# Patient Record
Sex: MALE | Race: OTHER | HISPANIC OR LATINO
[De-identification: names, ages, dates, MRNs, and addresses within clinical notes are randomized per-mention and may not be internally consistent; named-entity substitution may affect disease eponyms.]

---

## 2019-02-21 PROBLEM — Z00.00 ENCOUNTER FOR PREVENTIVE HEALTH EXAMINATION: Status: ACTIVE | Noted: 2019-02-21

## 2019-02-28 ENCOUNTER — APPOINTMENT (OUTPATIENT)
Dept: NEUROLOGY | Facility: CLINIC | Age: 64
End: 2019-02-28
Payer: MEDICARE

## 2019-02-28 VITALS
DIASTOLIC BLOOD PRESSURE: 83 MMHG | WEIGHT: 159 LBS | TEMPERATURE: 98.7 F | OXYGEN SATURATION: 96 % | SYSTOLIC BLOOD PRESSURE: 157 MMHG | HEIGHT: 65 IN | HEART RATE: 74 BPM | BODY MASS INDEX: 26.49 KG/M2

## 2019-02-28 VITALS — SYSTOLIC BLOOD PRESSURE: 176 MMHG | DIASTOLIC BLOOD PRESSURE: 96 MMHG

## 2019-02-28 DIAGNOSIS — Z82.49 FAMILY HISTORY OF ISCHEMIC HEART DISEASE AND OTHER DISEASES OF THE CIRCULATORY SYSTEM: ICD-10-CM

## 2019-02-28 DIAGNOSIS — Z83.3 FAMILY HISTORY OF DIABETES MELLITUS: ICD-10-CM

## 2019-02-28 DIAGNOSIS — Z86.69 PERSONAL HISTORY OF OTHER DISEASES OF THE NERVOUS SYSTEM AND SENSE ORGANS: ICD-10-CM

## 2019-02-28 PROCEDURE — 99213 OFFICE O/P EST LOW 20 MIN: CPT

## 2019-02-28 PROCEDURE — 95984 ALYS BRN NPGT PRGRMG ADDL 15: CPT

## 2019-02-28 PROCEDURE — 95983 ALYS BRN NPGT PRGRMG 15 MIN: CPT

## 2019-02-28 NOTE — DISCUSSION/SUMMARY
[FreeTextEntry1] : ET s/p L czi DBS with residual right hand action tremor that interferes with some ADLs. \par \par Recommendations:\par DBS programmed today with noted tremor reduction:\par     -DBS programming 20 minutes.\par \par F/U with Dr. Koeppel for seizure management\par \par F/U 3 months\par

## 2019-02-28 NOTE — PHYSICAL EXAM
[General Appearance - Alert] : alert [General Appearance - In No Acute Distress] : in no acute distress [Cranial Nerves Oculomotor (III)] : extraocular motion intact [Cranial Nerves Facial (VII)] : face symmetrical [FreeTextEntry1] : There is a mild chin tremor. There is a mild vocal tremor on phonation. Rest tremor is absent. There is a moderate distal tremor in his left hand with winged tremor. His right hand postural tremor is minimal, but breakthrough as he he hold his hand close to his mouth. His action tremor is 2+ R, 3+ L. There is no bradykinesia. There is mild overshoot on finger chasing b/l. Walks with narrow base. Unsteady tandem walking [FreeTextEntry8] : refer to movement exam

## 2019-02-28 NOTE — HISTORY OF PRESENT ILLNESS
[FreeTextEntry1] : Patient with PMH of seizure d/o who is here for  followup for management of his ET. He has a left cZI DBS implanted in 2016. Over the past several months, he reports that his right hand tremor has a larger amplitude  that seem to be generated proximally. He is not able to shave on his own. He holds his right hand in order to eat, and dresses independently. His gait is stable and has not fallen. He struggles to write as well. His seizure meds were changed by Dr. Koeppel 5- 6months ago due to patient having breakthrough seizure.

## 2019-05-23 ENCOUNTER — APPOINTMENT (OUTPATIENT)
Dept: NEUROLOGY | Facility: CLINIC | Age: 64
End: 2019-05-23
Payer: MEDICARE

## 2019-05-23 VITALS
BODY MASS INDEX: 26.66 KG/M2 | HEIGHT: 65 IN | HEART RATE: 70 BPM | WEIGHT: 160 LBS | SYSTOLIC BLOOD PRESSURE: 146 MMHG | DIASTOLIC BLOOD PRESSURE: 85 MMHG | OXYGEN SATURATION: 95 %

## 2019-05-23 PROCEDURE — 95983 ALYS BRN NPGT PRGRMG 15 MIN: CPT

## 2019-05-23 PROCEDURE — 99214 OFFICE O/P EST MOD 30 MIN: CPT

## 2019-05-23 NOTE — HISTORY OF PRESENT ILLNESS
[FreeTextEntry1] : He states that his tremor has increased in amplitude. This occurs in episodes and not consistent. He can He can eat with his right hand using a spoon. He sometimes spills his food and cannot shave. He handwriting is not legible and has his wife sign for him. His GBP was reduced to 400mg TID by Dr. Koeppel for balance issues. He has not had any falls and dose reduction has not been beneficial.

## 2019-05-23 NOTE — PHYSICAL EXAM
[FreeTextEntry1] : There are no tremors at rest., There is no postural tremor on the right and 3+ on the left. There is 2+ action tremor on finger to nose with the ritght hand and 3+ with left hand. Tremor amplitude increases as it approaches target. Walks with narrow base. Tandem gait unsteady. Handwriting is illegible due to prominent writing tremor and sprials with right-hand have large amplitude waveforems.

## 2019-05-23 NOTE — DISCUSSION/SUMMARY
[FreeTextEntry1] : Patient with ET s/p Left VIM/czi DBS with residual writing and action tremor\par \par Plan\par DBS adjusted today\par consider trial of perampanel. Will d/w Dr. Koeppel\par Cont GBP 400mg TID\par \par f/u 4months

## 2019-09-26 ENCOUNTER — APPOINTMENT (OUTPATIENT)
Dept: NEUROLOGY | Facility: CLINIC | Age: 64
End: 2019-09-26
Payer: MEDICARE

## 2019-09-26 VITALS
DIASTOLIC BLOOD PRESSURE: 76 MMHG | WEIGHT: 315 LBS | HEART RATE: 80 BPM | TEMPERATURE: 98.7 F | OXYGEN SATURATION: 95 % | SYSTOLIC BLOOD PRESSURE: 136 MMHG | BODY MASS INDEX: 218.88 KG/M2

## 2019-09-26 PROCEDURE — 95970 ALYS NPGT W/O PRGRMG: CPT

## 2019-09-26 PROCEDURE — 99214 OFFICE O/P EST MOD 30 MIN: CPT

## 2019-09-26 RX ORDER — LACOSAMIDE 150 MG/1
150 TABLET, FILM COATED ORAL
Refills: 0 | Status: DISCONTINUED | COMMUNITY
Start: 2019-02-28 | End: 2019-09-26

## 2019-09-26 NOTE — DISCUSSION/SUMMARY
[FreeTextEntry1] : Improved rught hand tremor with residual postural tremor when his right hand close to his face. Eating has improved. DBS was unchanged today. Will d/w Dr. Koeppel about starting patient on perampanel to see if further tremor response achieved. RTO 2-3 months

## 2019-09-26 NOTE — PHYSICAL EXAM
[FreeTextEntry1] : There are no tremors at rest., There is no postural tremor on the right and 2+ on the left. There is mild action tremor on finger to nose with the right hand and 3+ with left hand. Trace winged tremor on the right. When wrist is extend with flexed elbow, action tremor is more prominent.  Tremor amplitude increases as it approaches target. Walks with narrow base. . Handwriting is illegible but less tremulous. Spirals have less waveforms when drawn with right hand

## 2019-09-26 NOTE — PROCEDURE
[FreeTextEntry1] : Deep Brain Stimulation Programming: Refer to scanned form(s)\par 100%\par \par settings: unchanged\par 0-1+: 5.8v/150/140\par

## 2019-12-12 ENCOUNTER — OTHER (OUTPATIENT)
Age: 64
End: 2019-12-12

## 2020-01-09 ENCOUNTER — APPOINTMENT (OUTPATIENT)
Dept: NEUROLOGY | Facility: CLINIC | Age: 65
End: 2020-01-09
Payer: MEDICARE

## 2020-01-09 VITALS
BODY MASS INDEX: 22.16 KG/M2 | SYSTOLIC BLOOD PRESSURE: 136 MMHG | DIASTOLIC BLOOD PRESSURE: 88 MMHG | HEART RATE: 66 BPM | TEMPERATURE: 99 F | WEIGHT: 133 LBS | HEIGHT: 65 IN | OXYGEN SATURATION: 98 %

## 2020-01-09 PROCEDURE — 99214 OFFICE O/P EST MOD 30 MIN: CPT

## 2020-01-09 PROCEDURE — 95983 ALYS BRN NPGT PRGRMG 15 MIN: CPT

## 2020-01-09 RX ORDER — LEVETIRACETAM 750 MG/1
750 TABLET, FILM COATED ORAL TWICE DAILY
Refills: 0 | Status: DISCONTINUED | COMMUNITY
Start: 2019-02-28 | End: 2020-01-09

## 2020-01-10 NOTE — PROCEDURE
[FreeTextEntry1] : Deep Brain Stimulation Programming:\par \par Left %\par 0-1+:5.8V/150/140\par \par Impedances: Contact 0-858; 1-2900;2-776,3-743\par TI: 2777/2.131mA\par \par switched to CC\par 0-1+: 5.4mA/140/140 - reduced action tremor. Able to drink from cup with right hand only\par \par DBS programming 7mins\par \par

## 2020-01-10 NOTE — DISCUSSION/SUMMARY
[FreeTextEntry1] : Improved hand tremor with notable functional gain since starting brivact. DBS readjusted today due to impedances changes at contact 1 with further improvement in drinking. He will call my office to inform us of the dose of perampanel he is on and I will see him back in 4months.

## 2020-01-10 NOTE — PHYSICAL EXAM
[FreeTextEntry1] : There are no tremors at rest., There is no postural tremor on the right and 3+ on the left. There is 1+ action tremor on finger to nose with the right hand and 3+ with left hand. Postural tremor is less when approaching his face. Able to hold a cup with right hand and hand shakes less when drinking and pouring. Handwriting is larger and less tremulous.  Walks with narrow base.

## 2020-01-10 NOTE — HISTORY OF PRESENT ILLNESS
[FreeTextEntry1] : Last month patient started Brivact 100mg BID by Dr. Day and has noticed reduction in his action tremor. He is able to shave. Has not noticed any changes in drinking or using utensils.  Balance is stable with no falls. He has not had any AEs. He has no issues charging RC, which he dose qod. He did not start propranolol and is off keppra\par \par \par Meds\par GBP:400mg TID\par Brivact 100mg BID

## 2020-07-16 ENCOUNTER — APPOINTMENT (OUTPATIENT)
Dept: NEUROLOGY | Facility: CLINIC | Age: 65
End: 2020-07-16

## 2021-10-28 ENCOUNTER — APPOINTMENT (OUTPATIENT)
Dept: NEUROLOGY | Facility: CLINIC | Age: 66
End: 2021-10-28
Payer: MEDICARE

## 2021-10-28 VITALS
BODY MASS INDEX: 21.16 KG/M2 | TEMPERATURE: 98.7 F | HEART RATE: 73 BPM | DIASTOLIC BLOOD PRESSURE: 95 MMHG | SYSTOLIC BLOOD PRESSURE: 160 MMHG | OXYGEN SATURATION: 98 % | WEIGHT: 127 LBS | HEIGHT: 65 IN

## 2021-10-28 PROCEDURE — 99214 OFFICE O/P EST MOD 30 MIN: CPT | Mod: 25

## 2021-10-28 PROCEDURE — 95970 ALYS NPGT W/O PRGRMG: CPT

## 2021-10-28 NOTE — DISCUSSION/SUMMARY
[FreeTextEntry1] : ET with L Czi DBS who is doing well overall. Right hand action tremor is mild, but he is able to do his ADLs at this time. \par \par Patient was counseled on the following recommendations:\par \par will leave DBS and meds unchanged\par will contact [employer] to replace patient's DBS  with updated version\par \par f/u 4months

## 2021-10-28 NOTE — HISTORY OF PRESENT ILLNESS
[FreeTextEntry1] : Patient has been stable over the last year\par Able to feed himself with his right hand. \par Left hand tremor remains bothersome and cannot do any ADLs with it\par walking is slightly unsteady w/o falls\par He charges his DBS every 3 days. Takes 1-2 hrs. \par Last seizure was 2 months ago and remains on brivact\par \par Meds\par GBP:400mg TID\par Brivact 125mg BID

## 2021-10-28 NOTE — END OF VISIT
PHYSICIAN NEXT STEPS:  Review Only    CHIEF COMPLAINT:  Chief Complaint/Protocol Used: Chest Pain  Onset: 2-3 days ago       ASSESSMENT:  ? Onset: 2-3 days ago   ? Location: L chest   ? Onset: 2-3 days ago (started again this am)   ? Severity: 6  ? Recurrent Symptom: Cold associated  ? Cause: Associated with cold symptoms   ? Cough: Yes, over a week   ? Work Or Exercise: No   ? Child's Appearance: A little less active, laying down now   -------------------------------------------------------    DISPOSITION:  Disposition Recommendation: See Physician within 24 Hours  Questions that led to disposition:  ? [1] MODERATE chest pain (interferes with normal activities) AND [2] unexplained (Exception: transient pain, brief pains, heartburn, pain due to coughing or sore muscles)  Patient Directed To: Unspecified  Patient Intended Action: Unspecified    CALL NOTES:  03/09/2020 at 7:36 AM by Gibson Campbell  ? Going to [facility] [place] [facility] [place]     DISPOSITION OVERRIDE/PROVIDER CONSULT:  Disposition Override: N/A  Override Source: Unspecified  Consulted with PCP: No  Consulted with On-Call Physician: No    CALLER CONTACT INFO:  Name: Fiorella Lomax (Mother)  Phone 1: [phone] (Work Phone)  Phone 2: [phone] (Mobile) - Preferred  Phone 3: [phone] (Home Phone)      ENCOUNTER STARTED:  03/09/20 07:28:54 AM  ENCOUNTER ASSIGNED TO/CLOSED BY:  Gibson Campbell @ 03/09/20 07:36:26 AM      -------------------------------------------------------    CARE ADVICE given per Chest Pain guideline.  SEE PHYSICIAN WITHIN 24 HOURS:  * IF OFFICE WILL BE OPEN: Your child needs to be examined within the next 24 hours. Call your child's doctor when the office opens, and make an appointment.  * IF OFFICE WILL BE CLOSED AND NO PCP TRIAGE: Your child needs to be examined within the next 24 hours. An Urgent Care Center is often a good source of care if your doctor's office is closed. Go to _________ .  * IF OFFICE WILL BE CLOSED AND PCP  TRIAGE REQUIRED: Your child may need to be seen within the next 24 hours. Your doctor will want to talk with you to decide what's best. I'll page him now. (Exception: from 10 pm to 7 am. Since this isn't serious, we'll   hold the page until morning.)  * IF PATIENT HAS NO PCP: Refer patient to an Urgent Care Center or Retail clinic.  Also try to help caller find a PCP (medical home) for their child.; PAIN MEDICINE:   * For pain relief, give acetaminophen every 4 hours OR ibuprofen every 6 hours as needed. (See Dosage table.); CALL BACK IF:   * Pain becomes SEVERE  * Pain becomes frequent  * Difficulty breathing occurs  * Your child becomes worse      UNDERSTANDS CARE ADVICE: Yes    AGREES WITH CARE ADVICE: Yes    WILL FOLLOW CARE ADVICE: Yes    -------------------------------------------------------   [Time Spent: ___ minutes] : I have spent [unfilled] minutes of time on the encounter.

## 2021-10-28 NOTE — PROCEDURE
[FreeTextEntry1] : Deep Brain Stimulation Programming:\par \par Left %\par 0-1+:5.4ma/140/140\par \par Impedances: Contact 0-839; 1-061;2-728, ,3-728\par Battery: 100%\par \par Final settings: unchanged\par \par DBS programming 7mins\par \par

## 2021-10-28 NOTE — PHYSICAL EXAM
[FreeTextEntry1] : There are no tremors at rest., There is no postural tremor on the right and 3+ on the left. There is 1-2+ action tremor on finger to nose with the right hand and 3+ with left hand. Postural tremor is less when approaching his face. Walks with narrow base. Handwriting: print is legible, less writing tremor

## 2022-02-03 ENCOUNTER — APPOINTMENT (OUTPATIENT)
Dept: NEUROLOGY | Facility: CLINIC | Age: 67
End: 2022-02-03
Payer: MEDICARE

## 2022-02-03 VITALS
DIASTOLIC BLOOD PRESSURE: 80 MMHG | HEIGHT: 65 IN | BODY MASS INDEX: 21.66 KG/M2 | WEIGHT: 130 LBS | TEMPERATURE: 98.6 F | SYSTOLIC BLOOD PRESSURE: 187 MMHG | OXYGEN SATURATION: 98 % | HEART RATE: 78 BPM

## 2022-02-03 PROCEDURE — 99214 OFFICE O/P EST MOD 30 MIN: CPT | Mod: 25

## 2022-02-03 PROCEDURE — 95970 ALYS NPGT W/O PRGRMG: CPT

## 2022-02-03 NOTE — PROCEDURE
[FreeTextEntry1] : Deep Brain Stimulation Programming:\par \par Left %\par 0-1+:5.4ma/140/140\par \par Impedances:ok\par Battery: 100%\par \par Final settings: unchanged\par \par DBS programming 7mins\par \par

## 2022-02-03 NOTE — DISCUSSION/SUMMARY
[FreeTextEntry1] : ET with L Czi DBS with mild increase in tremors L>R following covid infxn.\par Transient vertigo appears related to BPPV based on transient nature and postural triggers. There were no new cerebellar signs on exam\par \par Patient was counseled on the following recommendations:\par leave DBS unchanged\par refer for home vestibular therapy\par will est care with epilepsy team at . His sz meds were renewed until he sees epileptologist \par \par f/u 4months. \par

## 2022-02-03 NOTE — HISTORY OF PRESENT ILLNESS
[FreeTextEntry1] : Had covid at end of December - symptoms included vomiting.\par \par Since then he has been more unsteady when walking. Denies any falls\par Left hand tremor has slightly increased\par DBS charging going ok. \par Gets episodic vertigo that is transient. Triggered when he lays down. This has been improving over the past several weeks. \par \par \par Meds\par GBP:400mg TID\par Brivact 100mg BID

## 2022-02-03 NOTE — PHYSICAL EXAM
[FreeTextEntry1] : EOMI. No nystagmus. There are no tremors at rest., There is no postural tremor on the right and 3+ on the left. There is 2+ action tremor on finger to nose with the right hand and 3+ with left hand. Postural tremor is less when approaching his face.  Walks with narrow base. No overshooting. Turns to right on fukuda
n/a

## 2022-05-05 ENCOUNTER — APPOINTMENT (OUTPATIENT)
Dept: NEUROLOGY | Facility: CLINIC | Age: 67
End: 2022-05-05
Payer: MEDICARE

## 2022-05-05 VITALS
HEART RATE: 71 BPM | SYSTOLIC BLOOD PRESSURE: 159 MMHG | WEIGHT: 129 LBS | OXYGEN SATURATION: 98 % | BODY MASS INDEX: 21.49 KG/M2 | TEMPERATURE: 98.3 F | HEIGHT: 65 IN | DIASTOLIC BLOOD PRESSURE: 89 MMHG

## 2022-05-05 DIAGNOSIS — H81.10 BENIGN PAROXYSMAL VERTIGO, UNSPECIFIED EAR: ICD-10-CM

## 2022-05-05 PROCEDURE — 95983 ALYS BRN NPGT PRGRMG 15 MIN: CPT

## 2022-05-05 PROCEDURE — 99214 OFFICE O/P EST MOD 30 MIN: CPT | Mod: 25

## 2022-06-08 ENCOUNTER — RX RENEWAL (OUTPATIENT)
Age: 67
End: 2022-06-08

## 2022-07-13 ENCOUNTER — RX RENEWAL (OUTPATIENT)
Age: 67
End: 2022-07-13

## 2022-07-13 ENCOUNTER — APPOINTMENT (OUTPATIENT)
Dept: NEUROLOGY | Facility: CLINIC | Age: 67
End: 2022-07-13

## 2022-07-13 VITALS
HEIGHT: 65 IN | HEART RATE: 68 BPM | OXYGEN SATURATION: 98 % | TEMPERATURE: 98.9 F | DIASTOLIC BLOOD PRESSURE: 87 MMHG | BODY MASS INDEX: 20.16 KG/M2 | SYSTOLIC BLOOD PRESSURE: 175 MMHG | WEIGHT: 121 LBS

## 2022-07-13 PROCEDURE — 99215 OFFICE O/P EST HI 40 MIN: CPT

## 2022-07-14 NOTE — PROCEDURE
[FreeTextEntry1] : Deep Brain Stimulation Programming:\par \par Left %\par 0-1+:5.4ma/140/140\par \par Impedances:ok\par Battery: 100%\par \par Notes: F 150 - reduced winged tremor slightly \par  \par Final settings: 5.4/140/150\par \par DBS programming 7mins\par \par

## 2022-07-14 NOTE — PHYSICAL EXAM
[FreeTextEntry1] : + VT. There are no tremors at rest., There is no postural tremor on the right and 3+ on the left. 2+ winged tremor of low amplitude on R, moderate on L. There is 1+ action tremor on finger to nose with the right hand and 3+ with left hand. Postural tremor is less when approaching his face. Prominent writing tremors. Spirals have large amplitude waveforms. Gait is narrow base with slight reduction in SL. Postural reflexes intact

## 2022-07-14 NOTE — ASSESSMENT
[FreeTextEntry1] : \par \par \par \par Plan:\par Increase frequency to 150 Hz\par Referral to vestibular rehabilitation

## 2022-07-14 NOTE — DISCUSSION/SUMMARY
[FreeTextEntry1] : ET with L Czi DBS with mild increase in tremors\par Transient vertigo appears related to BPPV based on transient nature and postural triggers. There were no new cerebellar signs on exam\par \par Patient was counseled on the following recommendations:\par DBS adjusted \par refer for vestibular therapy\par will est care with epilepsy team at .\par f/u 4months. \par

## 2022-07-14 NOTE — HISTORY OF PRESENT ILLNESS
[FreeTextEntry1] : Patient states that his tremors and balanced worsened since having covid in January. The tremor does not interfere with his feeding himself, but he has to use a straw to drink, and can not drink from a cup. \par Experiences occasional vertigo when standing from supine position. It also happens when he is walking but has not fallen. \par \par He is taking his AEDs but has not established care with an epileptologist since last visit\par Last seizure was about two months ago as GTCs\par \par Meds\par GBP:400mg TID\par Brivact 100mg BID\par \par \par

## 2022-08-17 ENCOUNTER — INPATIENT (INPATIENT)
Facility: HOSPITAL | Age: 67
LOS: 1 days | Discharge: HOME | DRG: 100 | End: 2022-08-19
Attending: PSYCHIATRY & NEUROLOGY | Admitting: PSYCHIATRY & NEUROLOGY
Payer: MEDICARE

## 2022-08-17 VITALS
DIASTOLIC BLOOD PRESSURE: 85 MMHG | HEIGHT: 66 IN | WEIGHT: 117.73 LBS | TEMPERATURE: 98 F | OXYGEN SATURATION: 98 % | RESPIRATION RATE: 18 BRPM | SYSTOLIC BLOOD PRESSURE: 173 MMHG | HEART RATE: 59 BPM

## 2022-08-17 LAB
ALBUMIN SERPL ELPH-MCNC: 4.6 G/DL — SIGNIFICANT CHANGE UP (ref 3.3–5)
ALP SERPL-CCNC: 77 U/L — SIGNIFICANT CHANGE UP (ref 40–120)
ALT FLD-CCNC: 13 U/L — SIGNIFICANT CHANGE UP (ref 10–45)
ANION GAP SERPL CALC-SCNC: 9 MMOL/L — SIGNIFICANT CHANGE UP (ref 5–17)
AST SERPL-CCNC: 29 U/L — SIGNIFICANT CHANGE UP (ref 10–40)
BASOPHILS # BLD AUTO: 0.02 K/UL — SIGNIFICANT CHANGE UP (ref 0–0.2)
BASOPHILS NFR BLD AUTO: 0.4 % — SIGNIFICANT CHANGE UP (ref 0–2)
BILIRUB SERPL-MCNC: 0.7 MG/DL — SIGNIFICANT CHANGE UP (ref 0.2–1.2)
BUN SERPL-MCNC: 15 MG/DL — SIGNIFICANT CHANGE UP (ref 7–23)
CALCIUM SERPL-MCNC: 9.3 MG/DL — SIGNIFICANT CHANGE UP (ref 8.4–10.5)
CHLORIDE SERPL-SCNC: 100 MMOL/L — SIGNIFICANT CHANGE UP (ref 96–108)
CO2 SERPL-SCNC: 29 MMOL/L — SIGNIFICANT CHANGE UP (ref 22–31)
CREAT SERPL-MCNC: 0.78 MG/DL — SIGNIFICANT CHANGE UP (ref 0.5–1.3)
EGFR: 97 ML/MIN/1.73M2 — SIGNIFICANT CHANGE UP
EOSINOPHIL # BLD AUTO: 0.05 K/UL — SIGNIFICANT CHANGE UP (ref 0–0.5)
EOSINOPHIL NFR BLD AUTO: 1 % — SIGNIFICANT CHANGE UP (ref 0–6)
GLUCOSE SERPL-MCNC: 119 MG/DL — HIGH (ref 70–99)
HCT VFR BLD CALC: 41.5 % — SIGNIFICANT CHANGE UP (ref 39–50)
HCV AB S/CO SERPL IA: 74.32 S/CO — HIGH
HCV AB SERPL-IMP: REACTIVE
HGB BLD-MCNC: 14.2 G/DL — SIGNIFICANT CHANGE UP (ref 13–17)
IMM GRANULOCYTES NFR BLD AUTO: 0.2 % — SIGNIFICANT CHANGE UP (ref 0–1.5)
LYMPHOCYTES # BLD AUTO: 1.84 K/UL — SIGNIFICANT CHANGE UP (ref 1–3.3)
LYMPHOCYTES # BLD AUTO: 36.3 % — SIGNIFICANT CHANGE UP (ref 13–44)
MAGNESIUM SERPL-MCNC: 2.2 MG/DL — SIGNIFICANT CHANGE UP (ref 1.6–2.6)
MCHC RBC-ENTMCNC: 30.9 PG — SIGNIFICANT CHANGE UP (ref 27–34)
MCHC RBC-ENTMCNC: 34.2 GM/DL — SIGNIFICANT CHANGE UP (ref 32–36)
MCV RBC AUTO: 90.2 FL — SIGNIFICANT CHANGE UP (ref 80–100)
MONOCYTES # BLD AUTO: 0.56 K/UL — SIGNIFICANT CHANGE UP (ref 0–0.9)
MONOCYTES NFR BLD AUTO: 11 % — SIGNIFICANT CHANGE UP (ref 2–14)
NEUTROPHILS # BLD AUTO: 2.59 K/UL — SIGNIFICANT CHANGE UP (ref 1.8–7.4)
NEUTROPHILS NFR BLD AUTO: 51.1 % — SIGNIFICANT CHANGE UP (ref 43–77)
NRBC # BLD: 0 /100 WBCS — SIGNIFICANT CHANGE UP (ref 0–0)
PHOSPHATE SERPL-MCNC: 3.8 MG/DL — SIGNIFICANT CHANGE UP (ref 2.5–4.5)
PLATELET # BLD AUTO: 176 K/UL — SIGNIFICANT CHANGE UP (ref 150–400)
POTASSIUM SERPL-MCNC: 3.8 MMOL/L — SIGNIFICANT CHANGE UP (ref 3.5–5.3)
POTASSIUM SERPL-SCNC: 3.8 MMOL/L — SIGNIFICANT CHANGE UP (ref 3.5–5.3)
PROT SERPL-MCNC: 7.7 G/DL — SIGNIFICANT CHANGE UP (ref 6–8.3)
RBC # BLD: 4.6 M/UL — SIGNIFICANT CHANGE UP (ref 4.2–5.8)
RBC # FLD: 13.2 % — SIGNIFICANT CHANGE UP (ref 10.3–14.5)
SODIUM SERPL-SCNC: 138 MMOL/L — SIGNIFICANT CHANGE UP (ref 135–145)
WBC # BLD: 5.07 K/UL — SIGNIFICANT CHANGE UP (ref 3.8–10.5)
WBC # FLD AUTO: 5.07 K/UL — SIGNIFICANT CHANGE UP (ref 3.8–10.5)

## 2022-08-17 PROCEDURE — 95819 EEG AWAKE AND ASLEEP: CPT | Mod: 26

## 2022-08-17 PROCEDURE — 93010 ELECTROCARDIOGRAM REPORT: CPT

## 2022-08-17 RX ORDER — ENOXAPARIN SODIUM 100 MG/ML
40 INJECTION SUBCUTANEOUS EVERY 24 HOURS
Refills: 0 | Status: DISCONTINUED | OUTPATIENT
Start: 2022-08-17 | End: 2022-08-19

## 2022-08-17 RX ORDER — BRIVARACETAM 25 MG/1
125 TABLET, FILM COATED ORAL
Refills: 0 | Status: DISCONTINUED | OUTPATIENT
Start: 2022-08-17 | End: 2022-08-18

## 2022-08-17 RX ORDER — BRIVARACETAM 25 MG/1
125 TABLET, FILM COATED ORAL
Refills: 0 | Status: DISCONTINUED | OUTPATIENT
Start: 2022-08-17 | End: 2022-08-17

## 2022-08-17 RX ORDER — NICOTINE POLACRILEX 2 MG
1 GUM BUCCAL DAILY
Refills: 0 | Status: DISCONTINUED | OUTPATIENT
Start: 2022-08-17 | End: 2022-08-19

## 2022-08-17 RX ORDER — GABAPENTIN 400 MG/1
600 CAPSULE ORAL THREE TIMES A DAY
Refills: 0 | Status: DISCONTINUED | OUTPATIENT
Start: 2022-08-17 | End: 2022-08-19

## 2022-08-17 RX ADMIN — GABAPENTIN 600 MILLIGRAM(S): 400 CAPSULE ORAL at 21:08

## 2022-08-17 RX ADMIN — ENOXAPARIN SODIUM 40 MILLIGRAM(S): 100 INJECTION SUBCUTANEOUS at 21:08

## 2022-08-17 RX ADMIN — Medication 1 PATCH: at 19:33

## 2022-08-17 RX ADMIN — BRIVARACETAM 250 MILLIGRAM(S): 25 TABLET, FILM COATED ORAL at 21:41

## 2022-08-17 RX ADMIN — Medication 1 PATCH: at 16:52

## 2022-08-17 NOTE — H&P ADULT - NSHPSOCIALHISTORY_GEN_ALL_CORE
, lives home w wife and daughter. Retired. Everyday marijuana user. Active smoker 1/2 pack/day for more than 30 years. Not ready to quit.

## 2022-08-17 NOTE — H&P ADULT - NSHPPHYSICALEXAM_GEN_ALL_CORE
PHYSICAL EXAM:      CONSTITUTIONAL: Well groomed, no apparent distress    EYES: PERRLA and symmetric, EOMI, No conjunctival or scleral injection, non-icteric     ENMT: Oral mucosa with moist membranes; nasal mucosa not inflamed; normal dentition; no pharyngeal injection or exudates.       NECK: Supple, symmetric and without tracheal deviation; thyroid gland not enlarged and without palpable masses     RESPIRATORY: No respiratory distress, no use of accessory muscles; CTA b/l, no wheezes, rales or rhonchi, no dullness or hyperresonance to percussion, no tactile fremitus, no subcutaneous emphysema     CARDIOVASCULAR: RRRR, +S1S2, no murmurs, no rubs, no gallops; no JVD; no peripheral edema     GASTROINTESTINAL: Soft, non tender, non distended, no rebound, no guarding; No palpable masses; no hepatosplenomegaly; no hernia palpated;     LYMPHATIC: No cervical LAD or tenderness; no axillary LAD or tenderness; no inguinal LAD or tenderness     MUSCULOSKELETAL: Normal gait and station; no digital clubbing or cyanosis; examination of the (head/neck, spine/ribs/pelvis, RUE, LUE, RLE, LLE) without misalignment, normal range of motion without pain, no spinal tenderness, normal muscle strength/tone     SKIN: No rashes or ulcers noted; no subcutaneous nodules or induration palpable     NEUROLOGIC:   Appropriate insight/judgment; A+O x 3, mood and affect appropriate, recent/remote memory intact.  CN II-XII intact; normal reflexes in upper and lower extremities, sensation intact in upper and lower extremities b/l to light touch; Babinski down b/l; no Kernig’s sign, no Brudzinski’s sign   Motor: + VT. There are no tremors at rest., There is no postural tremor on the right and 3+ on the left. 2+ winged tremor of low amplitude on R, moderate on L. There is 1+ action tremor on finger to nose with the right hand and 3+ with left hand. Postural tremor is less when approaching his face. Prominent writing tremors. Spirals have large amplitude waveforms. Gait is narrow base with slight reduction in SL. Postural reflexes intact. PHYSICAL EXAM:      GENERAL: NAD, thin man    CONSTITUTIONAL: Well groomed, no apparent distress    EYES: PERRLA and symmetric, EOMI, No conjunctival or scleral injection, non-icteric     ENMT: Oral mucosa with moist membranes; nasal mucosa not inflamed; normal dentition; no pharyngeal injection or exudates.       NECK: Supple, symmetric and without tracheal deviation; thyroid gland not enlarged and without palpable masses     RESPIRATORY: No respiratory distress, no use of accessory muscles; CTA b/l, no wheezes, rales or rhonchi, no dullness or hyperresonance to percussion, no tactile fremitus, no subcutaneous emphysema     CARDIOVASCULAR: RRRR, +S1S2, no murmurs, no rubs, no gallops; no JVD; no peripheral edema     GASTROINTESTINAL: Soft, non tender, non distended, no rebound, no guarding; No palpable masses; no hepatosplenomegaly; no hernia palpated;     LYMPHATIC: No cervical LAD or tenderness; no axillary LAD or tenderness; no inguinal LAD or tenderness     MUSCULOSKELETAL: Normal gait and station; no digital clubbing or cyanosis; examination of the (head/neck, spine/ribs/pelvis, RUE, LUE, RLE, LLE) without misalignment, normal range of motion without pain, no spinal tenderness, normal muscle strength/tone     SKIN: No rashes or ulcers noted; no subcutaneous nodules or induration palpable     NEUROLOGIC:   Appropriate insight/judgment; A+O x 3, mood and affect appropriate, recent/remote memory intact.  CN II-XII intact; normal reflexes in upper and lower extremities, sensation intact in upper and lower extremities: vibration decreased b/l in LE, light touch is intact; Babinski down b/l; no Kernig’s sign, no Brudzinski’s sign   Motor: + VT. There are no tremors at rest., There is postural tremor on the right and 3+ on the left. 2+ winged tremor of low amplitude on R, moderate on L. There is 2+ action tremor on finger to nose with the right hand and 3+ with left hand. Postural tremor is less when approaching his face. Prominent writing tremors. Spirals have large amplitude waveforms. Gait is narrow base with slight reduction in SL. Postural reflexes intact.

## 2022-08-17 NOTE — H&P ADULT - ASSESSMENT
66 yo M CT s/p DBS placement and epilepsy presents for 72h VEEG  in EMU to capture/characterize events and possible med changes.     Active Problems  Epilepsy (345.90) (G40.909)  Essential tremor (333.1) (G25.0)      Plan:   VEEG - 72 h  Continue Briviact and GBP at current doses for now                  66 yo M s/p DBS placement and epilepsy presents for epilepsy management. Hx was obtained from the patient, chart and his spouse at bedside. Patient reports that first seizures since his childhood. Seizures described as generalized tonic clonic seizures, at times a/w with tongue bite and sometimes with urine incontinence. The most seizures starts with R hand rhythmical grasping movements, cannot speak, may have some oral automatisms, no head/eyes deviation, lasts 2 min. Sometimes he gets angry in his confusional state. Postictal drowsiness and sleep. He believes that the frequency has been increasing and now occurs every month or so. His currently on Briviactt 125 bid,  tid.     Active Problems and Plan:  Epilepsy (345.90) (G40.909)    -start VEEG - 72 h  -c/w Briviact 125 mg bid  -c/w Gabapentin 600 mg tid      Essential tremor (333.1) (G25.0)  -Pt on DBS (5 y ago)  -Pt following MDS (Dr. Dorman)     Failure to thrive:   - Nutrition specialist assessment                    68 yo M s/p DBS placement and epilepsy presents for epilepsy management. Hx was obtained from the patient, chart and his spouse at bedside. Patient reports that first seizures since his childhood. Seizures described as generalized tonic clonic seizures, at times a/w with tongue bite and sometimes with urine incontinence. The most seizures starts with R hand rhythmical grasping movements, cannot speak, may have some oral automatisms, no head/eyes deviation, lasts 2 min. Sometimes he gets angry in his confusional state. Postictal drowsiness and sleep. He believes that the frequency has been increasing and now occurs every month or so. His currently on Briviactt 125 bid,  tid.     Active Problems and Plan:  Epilepsy (345.90) (G40.909)    -start VEEG - 72 h  -c/w Briviact 125 mg bid  -c/w Gabapentin 600 mg tid      Essential tremor (333.1) (G25.0)  -Pt on DBS (5 y ago)  -Pt following MDS (Dr. Dorman)     Failure to thrive:   - Nutrition specialist assessment    Problem: Prophylactic measure.      DVT ppx: Lovenox 40mg Q12   GI ppx: no need  Diet: Regular + Protein shake (Ensure)  Activity: KELVIN, seizure/ fall protocol. constant obs   Dispo: acute rehab   Code status: FULL.

## 2022-08-17 NOTE — PATIENT PROFILE ADULT - FALL HARM RISK - HARM RISK INTERVENTIONS

## 2022-08-17 NOTE — H&P ADULT - HISTORY OF PRESENT ILLNESS
66 yo M CT s/p DBS placement and epilepsy presents for epilepsy management    Patient reports that first seizure was 5 years ago  Seizures described as generalized tonic clonic seizures, at times a/w with tongue bite  He believes that the frequency has been increasing and now occurs every month or so. He says he wife will say that he "got sick" and he will not know what happened.    Previously followed with Neurologist Dr. Barbara Koeppel at [facility] for his seizures. She has reportedly retired. No records currently available.   Patient does not recall all previous med tried- recalls LEV and Dilantin but does not know why it was changed. Also not sure when his medication doses were changed.  Patient states that his tremors and balanced worsened since having covid in January. The tremor does not interfere with his feeding himself, but he has to use a straw to drink, and can not drink from a cup.   Experiences occasional vertigo when standing from supine position. It also happens when he is walking but has not fallen.     He is taking his AEDs but has not established care with an epileptologist since last visit  Last seizure was about two months ago as GTCs    Meds  GBP:400mg TID  Brivact 100mg BID     66 yo M s/p DBS placement and epilepsy presents for epilepsy management. Hx was obtained from the patient, chart and his spouse at bedside. Patient reports that first seizures since his childhood. Seizures described as generalized tonic clonic seizures, at times a/w with tongue bite and sometimes with urine incontinence. The most seizures starts with R hand rhythmical grasping movements, cannot speak, may have some oral automatisms, no head/eyes deviation, lasts 2 min. Sometimes he gets angry in his confusional state. Postictal drowsiness and sleep. He believes that the frequency has been increasing and now occurs every month or so. He says he wife will say that he "got sick" and he will not know what happened. Previously followed with Neurologist Dr. Barbara Koeppel at [facility] for his seizures. She has reportedly retired. No records currently available. Patient does not recall all previous med tried- recalls LEV and Dilantin but does not know why it was changed. Also not sure when his medication doses were changed. Patient states that his tremors and balanced worsened since having covid in January. The tremor does not interfere with his feeding himself, but he has to use a straw to drink, and can not drink from a cup. Experiences occasional vertigo when standing from supine position. It also happens when he is walking but has not fallen. He is taking his AEDs but has not established care with an epileptologist since last visit. Last seizure was about 10 days ago as GTCS.   Current meds: GBP:600 mg TID, Briviact 125 mg BID. Denies HA, blurry vision, swallowing problems, weakness or paresthesia, CP, SOB, N/V, constipation, dysuria, admits wt loss lastly, balance problems

## 2022-08-18 ENCOUNTER — TRANSCRIPTION ENCOUNTER (OUTPATIENT)
Age: 67
End: 2022-08-18

## 2022-08-18 DIAGNOSIS — Z96.89 PRESENCE OF OTHER SPECIFIED FUNCTIONAL IMPLANTS: Chronic | ICD-10-CM

## 2022-08-18 LAB
COVID-19 NUCLEOCAPSID GAM AB INTERP: POSITIVE
COVID-19 NUCLEOCAPSID TOTAL GAM ANTIBODY RESULT: 68.1 INDEX — HIGH
COVID-19 SPIKE DOMAIN AB INTERP: POSITIVE
COVID-19 SPIKE DOMAIN ANTIBODY RESULT: 158 U/ML — HIGH
HBV SURFACE AG SER-ACNC: SIGNIFICANT CHANGE UP
SARS-COV-2 IGG+IGM SERPL QL IA: 158 U/ML — HIGH
SARS-COV-2 IGG+IGM SERPL QL IA: 68.1 INDEX — HIGH
SARS-COV-2 IGG+IGM SERPL QL IA: POSITIVE
SARS-COV-2 IGG+IGM SERPL QL IA: POSITIVE

## 2022-08-18 PROCEDURE — 95720 EEG PHY/QHP EA INCR W/VEEG: CPT

## 2022-08-18 PROCEDURE — 99223 1ST HOSP IP/OBS HIGH 75: CPT

## 2022-08-18 RX ORDER — BRIVARACETAM 25 MG/1
150 TABLET, FILM COATED ORAL
Refills: 0 | Status: DISCONTINUED | OUTPATIENT
Start: 2022-08-18 | End: 2022-08-19

## 2022-08-18 RX ORDER — GABAPENTIN 400 MG/1
1 CAPSULE ORAL
Qty: 0 | Refills: 0 | DISCHARGE

## 2022-08-18 RX ORDER — BRIVARACETAM 25 MG/1
25 TABLET, FILM COATED ORAL ONCE
Refills: 0 | Status: DISCONTINUED | OUTPATIENT
Start: 2022-08-18 | End: 2022-08-18

## 2022-08-18 RX ADMIN — BRIVARACETAM 210 MILLIGRAM(S): 25 TABLET, FILM COATED ORAL at 14:55

## 2022-08-18 RX ADMIN — GABAPENTIN 600 MILLIGRAM(S): 400 CAPSULE ORAL at 13:22

## 2022-08-18 RX ADMIN — Medication 1 PATCH: at 13:22

## 2022-08-18 RX ADMIN — GABAPENTIN 600 MILLIGRAM(S): 400 CAPSULE ORAL at 05:54

## 2022-08-18 RX ADMIN — Medication 1 PATCH: at 07:07

## 2022-08-18 RX ADMIN — ENOXAPARIN SODIUM 40 MILLIGRAM(S): 100 INJECTION SUBCUTANEOUS at 21:51

## 2022-08-18 RX ADMIN — Medication 1 PATCH: at 17:12

## 2022-08-18 RX ADMIN — GABAPENTIN 600 MILLIGRAM(S): 400 CAPSULE ORAL at 21:55

## 2022-08-18 RX ADMIN — BRIVARACETAM 150 MILLIGRAM(S): 25 TABLET, FILM COATED ORAL at 21:52

## 2022-08-18 RX ADMIN — Medication 1 PATCH: at 13:00

## 2022-08-18 RX ADMIN — BRIVARACETAM 250 MILLIGRAM(S): 25 TABLET, FILM COATED ORAL at 06:29

## 2022-08-18 NOTE — DISCHARGE NOTE PROVIDER - NSDCMRMEDTOKEN_GEN_ALL_CORE_FT
Briviact: 125 milligram(s) orally 2 times a day  gabapentin 600 mg oral tablet: 1 tab(s) orally 3 times a day   Briviact 75 mg oral tablet: 2 tab(s) orally 2 times a day  gabapentin 600 mg oral tablet: 1 tab(s) orally 3 times a day   brivaracetam 50 mg oral tablet: 1 tab(s) orally 2 times a day MDD:100 mg  Briviact 100 mg oral tablet: 1 tab(s) orally 2 times a day  gabapentin 600 mg oral tablet: 1 tab(s) orally 3 times a day

## 2022-08-18 NOTE — DISCHARGE NOTE PROVIDER - NSDCFUSCHEDAPPT_GEN_ALL_CORE_FT
Rmei Scott  [facility] Physician [facility]  NEUROLOGY [address]  Scheduled Appointment: 09/01/2022    Liseth Hawthorne  [facility]  NEUROLOGY [address]  Scheduled Appointment: 11/07/2022     Liseth Hawthorne  [facility]  NEUROLOGY [address]  Scheduled Appointment: 08/24/2022    Remi Scott  [facility]  NEUROLOGY [address]  Scheduled Appointment: 09/01/2022    Liseth Hawthorne  [facility]  NEUROLOGY [address]  Scheduled Appointment: 11/07/2022

## 2022-08-18 NOTE — DIETITIAN INITIAL EVALUATION ADULT - PERTINENT LABORATORY DATA
08-17    138  |  100  |  15  ----------------------------<  119<H>  3.8   |  29  |  0.78    Ca    9.3      17 Aug 2022 20:16  Phos  3.8     08-17  Mg     2.2     08-17    TPro  7.7  /  Alb  4.6  /  TBili  0.7  /  DBili  x   /  AST  29  /  ALT  13  /  AlkPhos  77  08-17

## 2022-08-18 NOTE — DISCHARGE NOTE PROVIDER - PROVIDER TOKENS
PROVIDER:[TOKEN:[[id number]],ESTABLISHEDPATIENT:[T]] PROVIDER:[TOKEN:[[id number]],SCHEDULEDAPPT:[08/24/2022],SCHEDULEDAPPTTIME:[09:00 AM],ESTABLISHEDPATIENT:[T]]

## 2022-08-18 NOTE — EEG REPORT - NS EEG TEXT BOX
[facility] Department of Neurology  Epilepsy Monitoring Unit video-Electroencephalogram    Patient Name:	SYBIL JANSEN    :	1955  MRN:	[id number]    Study Start Date/Time:  2022, 2:17:21 PM  Study End Date/Time:	IN PROGRESS    Referred by: Liseth Hawthorne MD    Brief Clinical History:  SYBIL JANSEN is a 67 year-old male with a history of generalized tonic-clonic seizures and essential tremor s/p DBS; EMU admission to characterize epileptic activity and titrate medications.    Diagnosis Code:   R56.9 convulsions/seizure    Pertinent Medications:  Brivaracetam 125 mg BID  Gabapentin 600 mg TID    Acquisition Details:  Electroencephalography was acquired using a minimum of 21 channels on an [employer] Neurology system v 8.5.1 with electrode placement according to the standard International 10-20 system following ACNS (American Clinical Neurophysiology Society) guidelines for Long-Term Video EEG monitoring.  Anterior temporal T1 and T2 electrodes were utilized whenever possible.   The XLTEK automated spike & seizure detections were all reviewed in detail, in addition to extensive portions of raw EEG.  The live video was monitored continuously by trained technicians to identify events and specialty nurses trained in seizure management supervised the care of the patient in the epilepsy unit.    Day 1: 2022 @ 2:17:21 PM to the next morning @ 7:00:00 AM  Background:  continuous, with predominantly alpha and beta frequencies.  Symmetry:  No persistent asymmetries of voltage or frequency.  Posterior Dominant Rhythm:  9.5 Hz symmetric, well-organized, and well-modulated.  Organization: Normal anterior to posterior gradient.  Voltage:  Normal (20+ uV)  Variability: Yes 		Reactivity: Yes  N2 sleep: Symmetric, synchronous spindles and K complexes.  Spontaneous Activity:  Frequent (1+/min < 1/10s) sharp wave discharges over the right temporal region seen during awake and drowsy states, and abundantly during sleep.   Periodic/rhythmic activity:  None  Events:  No electrographic seizures or significant clinical events.  Provocations:  Hyperventilation and Photic stimulation: was not performed.    Daily Summary:    1)	Iavucrbq-ox-lnddirgk sharp wave discharges over the right temporal region suggestive of underlying epileptic potential.   2)	Frequent right temporal slowing suggestive of underlying cerebral dysfunction.     Navjot Wilson DO   CNP Fellow    Mariah Hatch  Attending Neurologist, [facility] Epilepsy Program   [facility] Department of Neurology  Epilepsy Monitoring Unit video-Electroencephalogram    Patient Name:	SYBIL JANSEN    :	1955  MRN:	[id number]    Study Start Date/Time:  2022, 2:17:21 PM  Study End Date/Time:	IN PROGRESS    Referred by: Liseth Hawthorne MD    Brief Clinical History:  SYBIL JANSEN is a 67 year-old male with a history of generalized tonic-clonic seizures and essential tremor s/p DBS; EMU admission to characterize epileptic activity and titrate medications.    Diagnosis Code:   R56.9 convulsions/seizure    Pertinent Medications:  Brivaracetam 125 mg BID  Gabapentin 600 mg TID    Acquisition Details:  Electroencephalography was acquired using a minimum of 21 channels on an [employer] Neurology system v 8.5.1 with electrode placement according to the standard International 10-20 system following ACNS (American Clinical Neurophysiology Society) guidelines for Long-Term Video EEG monitoring.  Anterior temporal T1 and T2 electrodes were utilized whenever possible.   The XLTEK automated spike & seizure detections were all reviewed in detail, in addition to extensive portions of raw EEG.  The live video was monitored continuously by trained technicians to identify events and specialty nurses trained in seizure management supervised the care of the patient in the epilepsy unit.    Day 1: 2022 @ 2:17:21 PM to the next morning @ 7:00:00 AM  Background:  continuous, with predominantly alpha and beta frequencies.  Symmetry:  Abundant (50-89%) admixed theta/delta slowing over the right temporal region.  Posterior Dominant Rhythm:  9.5 Hz symmetric, well-organized, and well-modulated.  Organization: Normal anterior to posterior gradient.  Voltage:  Normal (20+ uV)  Variability: Yes 		Reactivity: Yes  N2 sleep: Symmetric, synchronous spindles and K complexes.  Spontaneous Activity:  Frequent (1+/min < 1/10s) sharp wave discharges over the right temporal region seen during awake and drowsy states, and abundantly during sleep.   Periodic/rhythmic activity:  None  Events:  No electrographic seizures or significant clinical events.  Provocations:  Hyperventilation and Photic stimulation: was not performed.    Daily Summary:    1)	Elyspiwj-ux-ooslshxi sharp wave discharges over the right temporal region suggestive of underlying epileptic potential.   2)	Abundant right temporal slowing suggestive of underlying cerebral dysfunction.     Navjot Wilson DO   CNP Fellow    Mariah Hatch  Attending Neurologist, [facility] Epilepsy Program   [facility] Department of Neurology Epilepsy Monitoring Unit video-Electroencephalogram  Patient Name:	SYBIL JANSEN   :	1955 MRN:	[id number]  Study Start Date/Time:  2022, 2:17:21 PM Study End Date/Time:	IN PROGRESS  Referred by: Liseth Hawthorne MD  Brief Clinical History:  SYBIL JANSEN is a 67 year-old male with a history of generalized tonic-clonic seizures and essential tremor s/p DBS; EMU admission to characterize epileptic activity and titrate medications.  Diagnosis Code:   R56.9 convulsions/seizure  Pertinent Medications: Brivaracetam 125 mg BID Gabapentin 600 mg TID  Acquisition Details: Electroencephalography was acquired using a minimum of 21 channels on an [employer] Neurology system v 8.5.1 with electrode placement according to the standard International 10-20 system following ACNS (American Clinical Neurophysiology Society) guidelines for Long-Term Video EEG monitoring.  Anterior temporal T1 and T2 electrodes were utilized whenever possible.   The XLTEK automated spike & seizure detections were all reviewed in detail, in addition to extensive portions of raw EEG.  The live video was monitored continuously by trained technicians to identify events and specialty nurses trained in seizure management supervised the care of the patient in the epilepsy unit.  Day 1: 2022 @ 2:17:21 PM to the next morning @ 7:00:00 AM Background:  continuous, with predominantly alpha and beta frequencies. Symmetry:  Abundant (50-89%) admixed theta/delta slowing over the right temporal region. Posterior Dominant Rhythm:  9.5 Hz symmetric, well-organized, and well-modulated. Organization: Normal anterior to posterior gradient. Voltage:  Normal (20+ uV) Variability: Yes 		Reactivity: Yes N2 sleep: Symmetric, synchronous spindles and K complexes. Spontaneous Activity:  Frequent (1+/min < 1/10s) sharp wave discharges over the right temporal region seen during awake and drowsy states, and abundantly during sleep.  Periodic/rhythmic activity:  None Events:  No electrographic seizures or significant clinical events. Provocations:  Hyperventilation and Photic stimulation: was not performed.  Abundant right temporal sharps in sleep    Daily Summary:   1)	Sileygzr-da-pixeuyeb sharp wave discharges over the right temporal region suggestive of underlying epileptic potential.  2)	Abundant right temporal slowing suggestive of underlying cerebral dysfunction.   Navjot Wilson DO  CNP Fellow  Mariah Hatch Attending Neurologist, [facility] Epilepsy [facility]

## 2022-08-18 NOTE — DISCHARGE NOTE PROVIDER - NSDCFUADDAPPT_GEN_ALL_CORE_FT
Please bring your Insurance card, Photo ID and Discharge paperwork to the following appointment:    (1) Please follow up with your Neurology Provider, Dr. Liseth Hawthorne at [address] [place] [address], [address] [place] [address] on 08/24/2022 at 9:00am.    Appointment was scheduled by Ms. DIDIER Monet, Referral Coordinator.

## 2022-08-18 NOTE — PROGRESS NOTE ADULT - ASSESSMENT
68 yo M s/p DBS placement and epilepsy presents for epilepsy management. Hx was obtained from the patient, chart and his spouse at bedside. Patient reports that first seizures since his childhood. Seizures described as generalized tonic clonic seizures, at times a/w with tongue bite and sometimes with urine incontinence. The most seizures starts with R hand rhythmical grasping movements, cannot speak, may have some oral automatisms, no head/eyes deviation, lasts 2 min. Sometimes he gets angry in his confusional state. Postictal drowsiness and sleep. He believes that the frequency has been increasing and now occurs every month or so. His currently on Briviactt 125 bid,  tid. Started VEEG: ***    Active Problems and Plan:  Epilepsy (345.90) (G40.909)    -c/w VEEG - 72 h  -c/w Briviact 125 mg bid  -c/w Gabapentin 600 mg tid    Essential tremor (333.1) (G25.0)  -Pt on DBS (5 y ago)  -Pt following MDS (Dr. Dorman)     Failure to thrive:   - Nutrition specialist assessment    Problem: Prophylactic measure.      DVT ppx: Lovenox 40mg Q12   GI ppx: no need  Diet: DASH/TLC/CC   Activity: KELVIN, seizure/ fall protocol. constant obs   Dispo: acute rehab   Code status: FULL.      66 yo M s/p DBS placement and epilepsy presents for epilepsy management. Hx was obtained from the patient, chart and his spouse at bedside. Patient reports that first seizures since his childhood. Seizures described as generalized tonic clonic seizures, at times a/w with tongue bite and sometimes with urine incontinence. The most seizures starts with R hand rhythmical grasping movements, cannot speak, may have some oral automatisms, no head/eyes deviation, lasts 2 min. Sometimes he gets angry in his confusional state. Postictal drowsiness and sleep. He believes that the frequency has been increasing and now occurs every month or so. His currently on Briviactt 125 bid,  tid. Daily VEEG: Gnzaubfo-jr-ugqjqejj sharp wave discharges over the right temporal region suggestive of underlying epileptic potential. Frequent right temporal slowing suggestive of underlying cerebral dysfunction. Will increase Briviact 150 bid. today.      Active Problems and Plan:    Epilepsy (345.90) (G40.909)  -c/w VEEG - 72 h  -start Briviact 150 mg bid  -c/w Gabapentin 600 mg tid    Essential tremor (333.1) (G25.0)  -Pt on DBS (5 y ago)  -Pt following MDS (Dr. Dorman)     Failure to thrive:   - Nutrition specialist assessment (done): Ensure Enlive  - Low dose CT chest/lungs (Smoking hx, wt loss about 30 lb w/in 5-6 mo)  - Hep C Ab positive, will check Hep RNA, U/S RUQ    Prophylactic measure.      DVT ppx: Lovenox 40mg Q24h  GI ppx: no need  Diet: Regular + Protein shakes  Activity: KELVIN, seizure/ fall protocol. constant obs     Dispo: Home  Code status: FULL.         Case discussed w Dr. Hatch

## 2022-08-18 NOTE — DISCHARGE NOTE PROVIDER - DETAILS OF MALNUTRITION DIAGNOSIS/DIAGNOSES
This patient has been assessed with a concern for Malnutrition and was treated during this hospitalization for the following Nutrition diagnosis/diagnoses:     -  08/18/2022: Severe protein-calorie malnutrition

## 2022-08-18 NOTE — DISCHARGE NOTE PROVIDER - NSDCCPCAREPLAN_GEN_ALL_CORE_FT
PRINCIPAL DISCHARGE DIAGNOSIS  Diagnosis: Focal epilepsy  Assessment and Plan of Treatment: - continue Briviact 150 mg twice a day  - continue Gabapentin 600 mg three times a day  - please keep appointments with your epilepsy doctor      SECONDARY DISCHARGE DIAGNOSES  Diagnosis: Essential tremor  Assessment and Plan of Treatment: - Please keep your appointments with your Movement Disorder Doctor  - continue Gabapentin 600 mg three times a day    Diagnosis: S/P deep brain stimulator placement  Assessment and Plan of Treatment: - Please keep your appointments with your Movement Disorder Specialist    Diagnosis: Sensory ataxia  Assessment and Plan of Treatment: - OP workup, balance training  - multivitamins containing Vit B    Diagnosis: Loss of weight  Assessment and Plan of Treatment: - outpatient w/up and nutritional speicialist follow up  - Protein shake  - Multivitamins

## 2022-08-18 NOTE — PROGRESS NOTE ADULT - ATTENDING COMMENTS
67 y old man with movement disorder and medically difficult to control epilepsy. Admitted on 8/17/2022 to capture and characterize seizures as well as to undergo medication adjustments under prolonged VEEG monitoring.  VEEG is abnormal, with intermittent polymorphic delta slowing over the right temporal region, as well as frequent epileptiform discharges over right temporal region. No seizures so far.    Plan:  Will increase Brivaracetam from 125 mg BID, to 150 mg BID  Will continue Gabapentin at unchanged doses  Seizure precautions  Continue VEEG monitoring to capture and characterize seizures and for safety during medication adjustments.

## 2022-08-18 NOTE — DIETITIAN INITIAL EVALUATION ADULT - PERTINENT MEDS FT
MEDICATIONS  (STANDING):  brivaracetam 150 milliGRAM(s) Oral <User Schedule>  brivaracetam  Injectable 25 milliGRAM(s) IV Push once  enoxaparin Injectable 40 milliGRAM(s) SubCutaneous every 24 hours  gabapentin 600 milliGRAM(s) Oral three times a day  nicotine -   7 mG/24Hr(s) Patch 1 Patch Transdermal daily    MEDICATIONS  (PRN):  LORazepam   Injectable 2 milliGRAM(s) IV Push every 2 hours PRN Seizure

## 2022-08-18 NOTE — DISCHARGE NOTE PROVIDER - HOSPITAL COURSE
HPI:  68 yo M s/p DBS placement and epilepsy presents for epilepsy management. Hx was obtained from the patient, chart and his spouse at bedside. Patient reports that first seizures since his childhood. Seizures described as generalized tonic clonic seizures, at times a/w with tongue bite and sometimes with urine incontinence. The most seizures starts with R hand rhythmical grasping movements, cannot speak, may have some oral automatisms, no head/eyes deviation, lasts 2 min. Sometimes he gets angry in his confusional state. Postictal drowsiness and sleep. He believes that the frequency has been increasing and now occurs every month or so. He says he wife will say that he "got sick" and he will not know what happened. Previously followed with Neurologist Dr. Barbara Koeppel at [facility] for his seizures. She has reportedly retired. No records currently available. Patient does not recall all previous med tried- recalls LEV and Dilantin but does not know why it was changed. Also not sure when his medication doses were changed. Patient states that his tremors and balanced worsened since having covid in January. The tremor does not interfere with his feeding himself, but he has to use a straw to drink, and can not drink from a cup. Experiences occasional vertigo when standing from supine position. It also happens when he is walking but has not fallen. He is taking his AEDs but has not established care with an epileptologist since last visit. Last seizure was about 10 days ago as GTCS.   Current meds: GBP:600 mg TID, Briviact 125 mg BID. Denies HA, blurry vision, swallowing problems, weakness or paresthesia, CP, SOB, N/V, constipation, dysuria, admits wt loss lastly, balance problems     (17 Aug 2022 12:13)    Presented with _____, found to have _____  Problem List/Main Diagnoses (system-based):   Inpatient treatment course:   MEDICATIONS  (STANDING):  brivaracetam 150 milliGRAM(s) Oral <User Schedule>  enoxaparin Injectable 40 milliGRAM(s) SubCutaneous every 24 hours  gabapentin 600 milliGRAM(s) Oral three times a day  nicotine -   7 mG/24Hr(s) Patch 1 Patch Transdermal daily    MEDICATIONS  (PRN):  LORazepam   Injectable 2 milliGRAM(s) IV Push every 2 hours PRN Seizure    New medications:   Labs to be followed outpatient:   Exam to be followed outpatient:        66 yo M s/p DBS placement for Essential tremor (5y ago) and epilepsy presents for epilepsy management under VEEG. Hx was obtained from the chart, patient and his wife at the bedside. Patient reports that first seizures since his childhood. Seizures described as generalized tonic clonic seizures, at times a/w with tongue bite and whole body shaking and sometimes with urine incontinence. The most seizures starts with one hand (cannot recall which hand) rhythmical grasping movements, cannot speak, may have some oral automatisms, no head/eyes deviation, lasts 2 min. Sometimes he gets angry in his confusional state. Postictal drowsiness and sleep. He believes that the frequency has been increasing and now occurs every month or so. He says he wife will say that he "got sick" and he will not know what happened. Previously followed with Neurologist Dr. Barbara Koeppel at [facility] for his seizures. She has reportedly retired. No records currently available. Patient does not recall all previous med tried- recalls LEV and Dilantin but does not know why it was changed. Also not sure when his medication doses were changed. Patient states that his tremors and balanced worsened since having covid in January. The tremor does not interfere with his feeding himself, but he has to use a straw to drink, and can not drink from a cup. Experiences occasional vertigo when standing from supine position. It also happens when he is walking but has not fallen. He is taking his AEDs but has not established care with an epileptologist since last visit. Last seizure was about 10 days ago as GTCS. They started to see . Medications on admission: GBP:600 mg TID, Briviact 125 mg BID. Denies HA, blurry vision, swallowing problems, weakness or paresthesia, CP, SOB, N/V, constipation, dysuria, admits wt loss lastly, balance problems, especially with closed eyes. Inpatient treatment course: He was put on continuous VEEG which showed obworvne-wz-yaekuecq sharp wave discharges over the right temporal region suggestive of underlying epileptic potential. Frequent right temporal slowing suggestive of underlying cerebral dysfunction. His Brivarecetam was increased to 150 bid. Overall his hospital stay was uneventful, no seizures observed. He is medically stable to be discharged home with f/u neurologist Dr. Hawthorne in OP settings. Patient education provided to avoid unsupervised activities that might pose danger, including swimming alone, diving, driving, climbing and working at heights; avoid alcohol and sleep deprivation. Strongly encouraged to take her prescription meds regularly.       MEDICATIONS:  brivaracetam 150 milliGRAM(s) Oral <User Schedule>  enoxaparin Injectable 40 milliGRAM(s) SubCutaneous every 24 hours  gabapentin 600 milliGRAM(s) Oral three times a day  nicotine -   7 mG/24Hr(s) Patch 1 Patch Transdermal daily  LORazepam   Injectable 2 milliGRAM(s) IV Push every 2 hours PRN Seizure      Neurological Examination:  General:  Appearance is consistent with chronologic age, underweight.   Cognitive/Language:  Awake, alert, and oriented to person, place, time and date, elements of bradyphrenia.  Recent and remote memory intact.  Fund of knowledge is appropriate.  Naming, repetition and comprehension intact. Nondysarthric.    Cranial Nerves  - Eyes: Visual acuity intact, Visual fields full.  EOMI w/o nystagmus, skew or reported double vision.  PERRL.  No ptosis/weakness of eyelid closure.    - Face:  Facial sensation normal V1 - 3, Left nasolabial fold flat.     - Ears/Nose/Throat:  Hearing grossly intact b/l to finger rub.  Palate elevates midline.  Tongue and uvula midline.   Normal reflexes in upper and lower extremities, sensation intact in upper extremities, LE: moderate to severe vibration decreased b/l (L>R) in LE, light touch is intact; Babinski down b/l; no Kernig’s sign, no Brudzinski’s sign    Motor: There is postural tremor on the right and 3+ on the left. 2+ winged tremor of low amplitude on R, moderate on L. There is 2+ action tremor on finger to nose with the right hand and 3+ with left hand. There are no tremors at rest. Postural tremor is less when approaching his face. Prominent writing tremors. Spirals have large amplitude waveforms. Gait is narrow base with slight reduction in SL. Postural reflexes intact. Cannot walk straight with closed eyes.            68 yo M s/p DBS placement for Essential tremor (5y ago) and epilepsy presents for epilepsy management under VEEG. Hx was obtained from the chart, patient and his wife at the bedside. Patient reports that first seizures since his childhood. Seizures described as generalized tonic clonic seizures, at times a/w with tongue bite and whole body shaking and sometimes with urine incontinence. The most seizures starts with one hand (cannot recall which hand) rhythmical grasping movements, cannot speak, may have some oral automatisms, no head/eyes deviation, lasts 2 min. Sometimes he gets angry in his confusional state. Postictal drowsiness and sleep. He believes that the frequency has been increasing and now occurs every month or so. He says he wife will say that he "got sick" and he will not know what happened. Previously followed with Neurologist Dr. Barbara Koeppel at [facility] for his seizures. She has reportedly retired. No records currently available. Patient does not recall all previous med tried- recalls LEV and Dilantin but does not know why it was changed. Also not sure when his medication doses were changed. Patient states that his tremors and balanced worsened since having covid in January. The tremor does not interfere with his feeding himself, but he has to use a straw to drink, and can not drink from a cup. Experiences occasional vertigo when standing from supine position. It also happens when he is walking but has not fallen. He is taking his AEDs but has not established care with an epileptologist since last visit. Last seizure was about 10 days ago as GTCS. They started to see . Medications on admission: GBP:600 mg TID, Briviact 125 mg BID. Denies HA, blurry vision, swallowing problems, weakness or paresthesia, CP, SOB, N/V, constipation, dysuria, admits wt loss lastly, balance problems, especially with closed eyes. Inpatient treatment course: He was put on continuous VEEG which showed [id number]-fe-rbhwmspd sharp wave discharges over the right temporal region suggestive of underlying epileptic potential. Frequent right temporal slowing suggestive of underlying cerebral dysfunction. His Brivarecetam was increased to 150 bid. Overall his hospital stay was uneventful, no seizures observed. He is medically stable to be discharged home with f/u neurologist Dr. Hawthorne in OP settings. Patient education provided to avoid unsupervised activities that might pose danger, including swimming alone, diving, driving, climbing and working at heights; avoid alcohol and sleep deprivation. Strongly encouraged to take her prescription meds regularly.       MEDICATIONS:  brivaracetam 150 milliGRAM(s) Oral <User Schedule>  enoxaparin Injectable 40 milliGRAM(s) SubCutaneous every 24 hours  gabapentin 600 milliGRAM(s) Oral three times a day  nicotine -   7 mG/24Hr(s) Patch 1 Patch Transdermal daily  LORazepam   Injectable 2 milliGRAM(s) IV Push every 2 hours PRN Seizure          Neurological Examination:  General:  Appearance is consistent with chronologic age, underweight.   Cognitive/Language:  Awake, alert, and oriented to person, place, time and date, elements of bradyphrenia.  Recent and remote memory intact.  Fund of knowledge is appropriate.  Naming, repetition and comprehension intact. Nondysarthric.    Cranial Nerves  - Eyes: Visual acuity intact, Visual fields full.  EOMI w/o nystagmus, skew or reported double vision.  PERRL.  No ptosis/weakness of eyelid closure.    - Face:  Facial sensation normal V1 - 3, Left nasolabial fold flat.     - Ears/Nose/Throat:  Hearing grossly intact b/l to finger rub.  Palate elevates midline.  Tongue and uvula midline.   Normal reflexes in upper and lower extremities, sensation intact in upper extremities, LE: moderate to severe vibration decreased b/l (L>R) in LE, light touch is intact; Babinski down b/l; no Kernig’s sign, no Brudzinski’s sign    Motor: There is postural tremor on the right and 3+ on the left. 2+ winged tremor of low amplitude on R, moderate on L. There is 2+ action tremor on finger to nose with the right hand and 3+ with left hand. There are no tremors at rest. Postural tremor is less when approaching his face. Prominent writing tremors. Spirals have large amplitude waveforms. Gait is narrow base with slight reduction in SL. Postural reflexes intact. Cannot walk straight with closed eyes.

## 2022-08-18 NOTE — DIETITIAN NUTRITION RISK NOTIFICATION - ADDITIONAL COMMENTS/DIETITIAN RECOMMENDATIONS
1. Continue regular diet + add Ensure Enlive QD (350kcal/20gpro)   2. Encourage PO intake  3. Consider addition of remeron   4. RD to remain available prn

## 2022-08-18 NOTE — DISCHARGE NOTE PROVIDER - ATTENDING DISCHARGE PHYSICAL EXAMINATION:
Physical Exam:  Thin non dysmorphic adult gentleman, in no distress  Poor dentition  Face is symmetric  Neck has full range of motion. No meningismus.  No torticollis or webbing  Chest is symmetric  Abdomen non distended  Back has no deformities  Awake, alert, good eye contact  Speaks in sentences  Follows simple commands well, but needs some redirectioning  Intact extraocular movements  Pupils equal and reactive to light  No nystagmus  Tongue midline  Low muscle bulk  Normal muscle tone  No focal weakness  Prominent left greater than right tremor  No dysmetria  Gait is slow paced  DTR deferred

## 2022-08-18 NOTE — PROVIDER CONTACT NOTE (CRITICAL VALUE NOTIFICATION) - SITUATION
Provider made aware that patient Deandre Newsome in 740-[address] ( 1955) is hepatitis C reactive on  test.

## 2022-08-18 NOTE — DIETITIAN INITIAL EVALUATION ADULT - OTHER INFO
66 yo M s/p DBS placement and epilepsy presents for epilepsy management. Hx was obtained from the patient, chart and his spouse at bedside. Patient reports that first seizures since his childhood. Seizures described as generalized tonic clonic seizures, at times a/w with tongue bite and sometimes with urine incontinence. The most seizures starts with R hand rhythmical grasping movements, cannot speak, may have some oral automatisms, no head/eyes deviation, lasts 2 min. Sometimes he gets angry in his confusional state. Postictal drowsiness and sleep. He believes that the frequency has been increasing and now occurs every month or so. His currently on Briviactt 125 bid,  tid.     Pt seen in room for nutrition assessment. Pt reports decreased PO intake d/t lack of appetite for several months. Reports not feeling hunger, denies N/V/D/C. Denies chewing/swallowing difficulty. Pt also reports ~30lb wt loss over past 5-6 months, (148-118lbs reflects 20% significant wt loss). Per ASPEN guidelines, pt meets criteria for severe protein-calorie malnutrition 2/2 significant wt loss, likely meeting <75% EER for >3 months. RD provided education on importance of optimal PO intake to prevent further wt loss. Discussed small, frequent, nutrient dense meals. Pt expressed understanding. Pt likes Ensure, pending order placed. Consider addition of appetite stimulant such as remeron. Please see full nutrition recommendations below. Will continue to follow per RD protocol.

## 2022-08-18 NOTE — DISCHARGE NOTE PROVIDER - CARE PROVIDER_API CALL
Liseth Hawthorne)  EEGEpilepsy; Neurology  [address], [address] [facility] [place] [address]  Phone: [phone]  Fax: [phone]  Established Patient  Follow Up Time:    Liseth Hawthorne)  EEGEpilepsy; Neurology  [address], [address] [place] [address]  Phone: [phone]  Fax: [phone]  Established Patient  Scheduled Appointment: 08/24/2022 09:00 AM

## 2022-08-18 NOTE — PROGRESS NOTE ADULT - SUBJECTIVE AND OBJECTIVE BOX
Neurology Progress Note    Interval History:    Patient was seen and examined, no acute event over night.     Medications:  brivaracetam  IVPB 125 milliGRAM(s) IV Intermittent two times a day  enoxaparin Injectable 40 milliGRAM(s) SubCutaneous every 24 hours  gabapentin 600 milliGRAM(s) Oral three times a day  LORazepam   Injectable 2 milliGRAM(s) IV Push every 2 hours PRN  nicotine -   7 mG/24Hr(s) Patch 1 Patch Transdermal daily      Vital Signs Last 24 Hrs  T(C): 36.9 (18 Aug 2022 06:00), Max: 37.8 (17 Aug 2022 20:38)  T(F): 98.5 (18 Aug 2022 06:00), Max: 100 (17 Aug 2022 20:38)  HR: 61 (18 Aug 2022 06:00) (58 - 61)  BP: 156/77 (18 Aug 2022 06:00) (134/74 - 173/85)  BP(mean): 114 (17 Aug 2022 10:21) (114 - 114)  RR: 16 (18 Aug 2022 06:00) (16 - 18)  SpO2: 97% (18 Aug 2022 06:00) (96% - 98%)    Parameters below as of 18 Aug 2022 06:00  Patient On (Oxygen Delivery Method): room air        Neurological Examination:  General:  Appearance is consistent with chronologic age.   Cognitive/Language:  Awake, alert, and oriented to person, place, time and date.  Recent and remote memory intact.  Fund of knowledge is appropriate.  Naming, repetition and comprehension intact. Nondysarthric.    Cranial Nerves  - Eyes: Visual acuity intact, Visual fields full.  EOMI w/o nystagmus, skew or reported double vision.  PERRL.  No ptosis/weakness of eyelid closure.    - Face:  Facial sensation normal V1 - 3, no facial asymmetry.    - Ears/Nose/Throat:  Hearing grossly intact b/l to finger rub.  Palate elevates midline.  Tongue and uvula midline.   Motor examination:  (MRC grade R/L) 5/5 UE; 5/5 LE.  No observable drift. Normal tone and bulk. No tenderness, twitching, tremors or involuntary movements.  Sensory examination:  Intact to light touch and pinprick, pain, temperature and proprioception and vibration in all extremities.  Reflexes:   2+ b/l biceps, triceps, patella and achilles.  Plantar response downgoing b/l.  Jaw jerk, Nacho, clonus absent.  Cerebellum:   FTN/HKS intact.  No dysmetria.    Gait narrow based and normal.    Labs:  CBC Full  -  ( 17 Aug 2022 20:16 )  WBC Count : 5.07 K/uL  RBC Count : 4.60 M/uL  Hemoglobin : 14.2 g/dL  Hematocrit : 41.5 %  Platelet Count - Automated : 176 K/uL  Mean Cell Volume : 90.2 fl  Mean Cell Hemoglobin : 30.9 pg  Mean Cell Hemoglobin Concentration : 34.2 gm/dL  Auto Neutrophil # : 2.59 K/uL  Auto Lymphocyte # : 1.84 K/uL  Auto Monocyte # : 0.56 K/uL  Auto Eosinophil # : 0.05 K/uL  Auto Basophil # : 0.02 K/uL  Auto Neutrophil % : 51.1 %  Auto Lymphocyte % : 36.3 %  Auto Monocyte % : 11.0 %  Auto Eosinophil % : 1.0 %  Auto Basophil % : 0.4 %    08-17    138  |  100  |  15  ----------------------------<  119<H>  3.8   |  29  |  0.78    Ca    9.3      17 Aug 2022 20:16  Phos  3.8     08-17  Mg     2.2     08-17    TPro  7.7  /  Alb  4.6  /  TBili  0.7  /  DBili  x   /  AST  29  /  ALT  13  /  AlkPhos  77  08-17    LIVER FUNCTIONS - ( 17 Aug 2022 20:16 )  Alb: 4.6 g/dL / Pro: 7.7 g/dL / ALK PHOS: 77 U/L / ALT: 13 U/L / AST: 29 U/L / GGT: x                 RADIOLOGY & ADDITIONAL TESTS:   Neurology Progress Note    Interval History:    Patient was seen and examined, no acute event over night.     Medications:  brivaracetam  IVPB 125 milliGRAM(s) IV Intermittent two times a day  enoxaparin Injectable 40 milliGRAM(s) SubCutaneous every 24 hours  gabapentin 600 milliGRAM(s) Oral three times a day  LORazepam   Injectable 2 milliGRAM(s) IV Push every 2 hours PRN  nicotine -   7 mG/24Hr(s) Patch 1 Patch Transdermal daily      Vital Signs Last 24 Hrs  T(C): 36.9 (18 Aug 2022 06:00), Max: 37.8 (17 Aug 2022 20:38)  T(F): 98.5 (18 Aug 2022 06:00), Max: 100 (17 Aug 2022 20:38)  HR: 61 (18 Aug 2022 06:00) (58 - 61)  BP: 156/77 (18 Aug 2022 06:00) (134/74 - 173/85)  BP(mean): 114 (17 Aug 2022 10:21) (114 - 114)  RR: 16 (18 Aug 2022 06:00) (16 - 18)  SpO2: 97% (18 Aug 2022 06:00) (96% - 98%)    Parameters below as of 18 Aug 2022 06:00  Patient On (Oxygen Delivery Method): room air        Neurological Examination:     General:  Appearance is consistent with chronologic age, underweight.      Cognitive/Language:  Awake, alert, and oriented to person, place, time and date, elements of bradyphrenia.  Recent and remote memory intact.  Fund of knowledge is appropriate.  Naming, repetition and comprehension intact. Nondysarthric.       Cranial Nerves     - Eyes: Visual acuity intact, Visual fields full.  EOMI w/o nystagmus, skew or reported double vision.  PERRL.  No ptosis/weakness of eyelid closure.       - Face:  Facial sensation normal V1 - 3, Left nasolabial fold flat.        - Ears/Nose/Throat:  Hearing grossly intact b/l to finger rub.  Palate elevates midline.  Tongue and uvula midline.      Normal reflexes in upper and lower extremities, sensation intact in upper extremities, LE: moderate to severe vibration decreased b/l (L>R) in LE, light touch is intact; Babinski down b/l; no Kernig’s sign, no Brudzinski’s sign       Motor: There is postural tremor on the right and 3+ on the left. 2+ winged tremor of low amplitude on R, moderate on L. There is 2+ action tremor on finger to nose with the right hand and 3+ with left hand. There are no tremors at rest. Postural tremor is less when approaching his face. Prominent writing tremors. Spirals have large amplitude waveforms. Gait is narrow base with slight reduction in SL. Postural reflexes intact. Cannot walk straight with closed eyes.     Labs:  CBC Full  -  ( 17 Aug 2022 20:16 )  WBC Count : 5.07 K/uL  RBC Count : 4.60 M/uL  Hemoglobin : 14.2 g/dL  Hematocrit : 41.5 %  Platelet Count - Automated : 176 K/uL  Mean Cell Volume : 90.2 fl  Mean Cell Hemoglobin : 30.9 pg  Mean Cell Hemoglobin Concentration : 34.2 gm/dL  Auto Neutrophil # : 2.59 K/uL  Auto Lymphocyte # : 1.84 K/uL  Auto Monocyte # : 0.56 K/uL  Auto Eosinophil # : 0.05 K/uL  Auto Basophil # : 0.02 K/uL  Auto Neutrophil % : 51.1 %  Auto Lymphocyte % : 36.3 %  Auto Monocyte % : 11.0 %  Auto Eosinophil % : 1.0 %  Auto Basophil % : 0.4 %    08-17    138  |  100  |  15  ----------------------------<  119<H>  3.8   |  29  |  0.78    Ca    9.3      17 Aug 2022 20:16  Phos  3.8     08-17  Mg     2.2     08-17    TPro  7.7  /  Alb  4.6  /  TBili  0.7  /  DBili  x   /  AST  29  /  ALT  13  /  AlkPhos  77  08-17    LIVER FUNCTIONS - ( 17 Aug 2022 20:16 )  Alb: 4.6 g/dL / Pro: 7.7 g/dL / ALK PHOS: 77 U/L / ALT: 13 U/L / AST: 29 U/L / GGT: x                 RADIOLOGY & ADDITIONAL TESTS:

## 2022-08-19 ENCOUNTER — TRANSCRIPTION ENCOUNTER (OUTPATIENT)
Age: 67
End: 2022-08-19

## 2022-08-19 VITALS
OXYGEN SATURATION: 95 % | RESPIRATION RATE: 18 BRPM | DIASTOLIC BLOOD PRESSURE: 89 MMHG | HEART RATE: 88 BPM | SYSTOLIC BLOOD PRESSURE: 171 MMHG

## 2022-08-19 PROBLEM — G40.909 EPILEPSY, UNSPECIFIED, NOT INTRACTABLE, WITHOUT STATUS EPILEPTICUS: Chronic | Status: ACTIVE | Noted: 2022-08-18

## 2022-08-19 PROBLEM — G25.0 ESSENTIAL TREMOR: Chronic | Status: ACTIVE | Noted: 2022-08-18

## 2022-08-19 PROCEDURE — 84100 ASSAY OF PHOSPHORUS: CPT

## 2022-08-19 PROCEDURE — 80171 DRUG SCREEN QUANT GABAPENTIN: CPT

## 2022-08-19 PROCEDURE — 99239 HOSP IP/OBS DSCHRG MGMT >30: CPT

## 2022-08-19 PROCEDURE — 87521 HEPATITIS C PROBE&RVRS TRNSC: CPT

## 2022-08-19 PROCEDURE — 80053 COMPREHEN METABOLIC PANEL: CPT

## 2022-08-19 PROCEDURE — 93005 ELECTROCARDIOGRAM TRACING: CPT

## 2022-08-19 PROCEDURE — 95713 VEEG 2-12 HR CONT MNTR: CPT

## 2022-08-19 PROCEDURE — 36415 COLL VENOUS BLD VENIPUNCTURE: CPT

## 2022-08-19 PROCEDURE — 86769 SARS-COV-2 COVID-19 ANTIBODY: CPT

## 2022-08-19 PROCEDURE — 95716 VEEG EA 12-26HR CONT MNTR: CPT

## 2022-08-19 PROCEDURE — 86803 HEPATITIS C AB TEST: CPT

## 2022-08-19 PROCEDURE — 87340 HEPATITIS B SURFACE AG IA: CPT

## 2022-08-19 PROCEDURE — 85025 COMPLETE CBC W/AUTO DIFF WBC: CPT

## 2022-08-19 PROCEDURE — 95700 EEG CONT REC W/VID EEG TECH: CPT

## 2022-08-19 PROCEDURE — 95720 EEG PHY/QHP EA INCR W/VEEG: CPT

## 2022-08-19 PROCEDURE — 83735 ASSAY OF MAGNESIUM: CPT

## 2022-08-19 RX ORDER — BRIVARACETAM 25 MG/1
1 TABLET, FILM COATED ORAL
Qty: 60 | Refills: 0
Start: 2022-08-19 | End: 2022-09-17

## 2022-08-19 RX ORDER — BRIVARACETAM 25 MG/1
2 TABLET, FILM COATED ORAL
Qty: 0 | Refills: 0 | DISCHARGE
Start: 2022-08-19

## 2022-08-19 RX ORDER — BRIVARACETAM 25 MG/1
125 TABLET, FILM COATED ORAL
Qty: 0 | Refills: 0 | DISCHARGE

## 2022-08-19 RX ORDER — BRIVARACETAM 25 MG/1
1 TABLET, FILM COATED ORAL
Qty: 0 | Refills: 0 | DISCHARGE

## 2022-08-19 RX ADMIN — Medication 1 PATCH: at 11:06

## 2022-08-19 RX ADMIN — Medication 1 PATCH: at 07:39

## 2022-08-19 RX ADMIN — GABAPENTIN 600 MILLIGRAM(S): 400 CAPSULE ORAL at 06:51

## 2022-08-19 RX ADMIN — BRIVARACETAM 150 MILLIGRAM(S): 25 TABLET, FILM COATED ORAL at 09:46

## 2022-08-19 NOTE — PROVIDER CONTACT NOTE (OTHER) - ASSESSMENT
Patient is siting on chair, calm and cooperative. No signs of distress. Left patient in safe position.

## 2022-08-19 NOTE — PROVIDER CONTACT NOTE (OTHER) - BACKGROUND
67 year old male that is s|p DBS for essential tremor. Epilepsy presented in VEEG. PMH: essential tremor and epilepsy. No allergies. Diet: CC with evening snack.

## 2022-08-19 NOTE — DISCHARGE NOTE NURSING/CASE MANAGEMENT/SOCIAL WORK - PATIENT PORTAL LINK FT
You can access the FollowMyHealth Patient Portal offered by [facility] by registering at the following website: [email or web address]. By joining [employer]’s FollowMyHealth portal, you will also be able to view your health information using other applications (apps) compatible with our system.

## 2022-08-19 NOTE — PROVIDER CONTACT NOTE (OTHER) - ACTION/TREATMENT ORDERED:
No action/treatment ordered at this time until team do their AM rounds to figure out a treatment plan for patients blood pressure.

## 2022-08-19 NOTE — DISCHARGE NOTE NURSING/CASE MANAGEMENT/SOCIAL WORK - NSDCPEFALRISK_GEN_ALL_CORE
For information on Fall & Injury Prevention, visit: [email or web address] OR  [email or web address] OR  https://www.cdc.gov/steadi/patient.html

## 2022-08-19 NOTE — EEG REPORT - NS EEG TEXT BOX
[facility] Department of Neurology Inpatient Epilepsy Monitoring Unit video-Electroencephalography Report  Acquisition Details: Electroencephalography was acquired using a minimum of 21 channels on an XLMbite Neurology system v 8.5.1 with electrode placement according to the standard International 10-20 system following ACNS (American Clinical Neurophysiology Society) guidelines for Long-Term Video EEG monitoring.  Anterior temporal T1 and T2 electrodes were utilized whenever possible.   The XLTEK automated spike & seizure detections were all reviewed in detail, in addition to extensive portions of raw EEG. Specially-trained nurses were available for seizure-related events. Continuous live-time video monitoring of the patients for seizure-related and safety events was performed by specially-trained technicians.  Daily Updates (7:00 AM to 7:00 AM): Day 2: 8/18 - 8/19/2022 Pertinent medications:  BID,  TID Awake Background:  continuous, with predominantly alpha and beta frequencies. Symmetry:  Abundant (50-89%) admixed theta/delta slowing over the right temporal region. Organization: Appropriate voltage-amplitude gradients. Posterior Dominant Rhythm: 9.5 Hz symmetric, well-organized, and well-modulated. Voltage:  Normal (20+ uV) Variability: Yes 		Reactivity: Yes N2 sleep: Symmetric, synchronous spindles and K complexes. Spontaneous Activity:  Frequent (1+/min < 1/10s) spike to sharp wave discharges over the right temporal region seen during awake and drowsy states, and abundantly during sleep.  Periodic/rhythmic activity:  None Events:  No electrographic seizures or significant clinical events. Provocations:  Hyperventilation and Photic stimulation: was not performed.  Abundant right temporal theta/delta slowing    Daily Summary:   1)	Kxzrhbmi-of-jynvbbkk sharp wave discharges over the right temporal region suggestive of underlying epileptic potential.  2)	Frequent right temporal slowing suggestive of underlying cerebral dysfunction.   Navjot Wilson DO  CNP Fellow  Mariah Hatch Attending Neurologist, [facility] Epilepsy Program   Day 3: 8/19/2022 @ 7:00 AM to 10:28 AM (END OF STUDY) Pertinent medications:  BID,  TID Awake Background:  continuous, with predominantly alpha and beta  frequencies. Symmetry:  Abundant (50-89%) admixed theta/delta slowing over the right temporal region. Organization: Appropriate voltage-amplitude gradients. Posterior Dominant Rhythm: 9.5 Hz symmetric, well-organized, and well-modulated. Voltage:  Normal (20+ uV) Variability: Yes 		Reactivity: Yes State changes: Present; awake and drowsy only. Spontaneous Activity:  Occasional (1+/hr <1/min) epileptiform discharges over the right temporal regions.  Periodic/rhythmic activity: None Events:  No electrographic seizures or significant clinical events. Provocations:  Hyperventilation and Photic stimulation: was not performed.  Daily Summary:   1)	Occasional sharp wave discharges over the right temporal region during awake and drowsy states.  2)	Abundant right temporal slowing suggestive of underlying cerebral dysfunction.  Navjot Wilson DO CNP Fellow  Mariah Hatch Attending Neurologist, [facility] Epilepsy [facility]   FINAL Summary: Abnormal continuous av-EEG study. 1)	Wmbhqswk-fy-vzookide sharp wave discharges over the right temporal region. 2)	Abundant right temporal slowing.  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 No seizures were captured during this recording.   Navjot Wilson DO CNP Fellow  Mariah Hatch Attending Neurologist, [facility] Epilepsy Program

## 2022-08-22 LAB — GABAPENTIN SERPLBLD-MCNC: 9.7 UG/ML — SIGNIFICANT CHANGE UP (ref 4–16)

## 2022-08-24 ENCOUNTER — APPOINTMENT (OUTPATIENT)
Dept: NEUROLOGY | Facility: CLINIC | Age: 67
End: 2022-08-24

## 2022-08-24 PROCEDURE — 99214 OFFICE O/P EST MOD 30 MIN: CPT | Mod: 95

## 2022-08-25 DIAGNOSIS — E43 UNSPECIFIED SEVERE PROTEIN-CALORIE MALNUTRITION: ICD-10-CM

## 2022-08-25 DIAGNOSIS — G40.909 EPILEPSY, UNSPECIFIED, NOT INTRACTABLE, WITHOUT STATUS EPILEPTICUS: ICD-10-CM

## 2022-08-25 DIAGNOSIS — Z96.82 PRESENCE OF NEUROSTIMULATOR: ICD-10-CM

## 2022-08-25 DIAGNOSIS — R62.7 ADULT FAILURE TO THRIVE: ICD-10-CM

## 2022-08-25 DIAGNOSIS — G25.0 ESSENTIAL TREMOR: ICD-10-CM

## 2022-08-25 DIAGNOSIS — R27.0 ATAXIA, UNSPECIFIED: ICD-10-CM

## 2022-08-29 LAB — SARS-COV-2 N GENE NPH QL NAA+PROBE: NOT DETECTED

## 2022-09-01 ENCOUNTER — APPOINTMENT (OUTPATIENT)
Dept: NEUROLOGY | Facility: CLINIC | Age: 67
End: 2022-09-01

## 2022-09-06 NOTE — HISTORY OF PRESENT ILLNESS
[FreeTextEntry1] : Mr. Newsome is a pt with ET s/p L czi DBS with residual right hand action tremor that interferes with some ADLs. \par Since last visit, he has established care with Dr. Hawthorne and was started on Zonisamide to a goal of 100mg nighty. EMu admission in July showed frequent right temporal sharp discharges and slowing.\par \par Today, he reports his tremors are****\par - vestibular therapy***\par - \par \par Current Meds\par GBP:400mg TID\par Brivact 100mg BID\par zonisamide 100mg qhs

## 2022-09-27 ENCOUNTER — APPOINTMENT (OUTPATIENT)
Dept: NEUROLOGY | Facility: CLINIC | Age: 67
End: 2022-09-27

## 2022-11-07 ENCOUNTER — RX RENEWAL (OUTPATIENT)
Age: 67
End: 2022-11-07

## 2022-11-08 ENCOUNTER — RX RENEWAL (OUTPATIENT)
Age: 67
End: 2022-11-08

## 2022-11-30 NOTE — PROVIDER CONTACT NOTE (OTHER) - SITUATION
RN reviewed patients AM vitals in chart to make sure vitals are stable. Blood pressure was 193/93. RN re took vitals, blood pressure was high. Patient shows no signs of distress. Notified team. Primary Defect Length In Cm (Final Defect Size - Required For Flaps/Grafts): 1

## 2022-12-08 ENCOUNTER — RX RENEWAL (OUTPATIENT)
Age: 67
End: 2022-12-08

## 2022-12-09 ENCOUNTER — RX RENEWAL (OUTPATIENT)
Age: 67
End: 2022-12-09

## 2023-01-05 ENCOUNTER — APPOINTMENT (OUTPATIENT)
Dept: NEUROLOGY | Facility: CLINIC | Age: 68
End: 2023-01-05

## 2023-01-05 ENCOUNTER — NON-APPOINTMENT (OUTPATIENT)
Age: 68
End: 2023-01-05

## 2023-01-25 ENCOUNTER — NON-APPOINTMENT (OUTPATIENT)
Age: 68
End: 2023-01-25

## 2023-01-25 ENCOUNTER — APPOINTMENT (OUTPATIENT)
Dept: NEUROLOGY | Facility: CLINIC | Age: 68
End: 2023-01-25
Payer: MEDICARE

## 2023-01-25 VITALS
DIASTOLIC BLOOD PRESSURE: 79 MMHG | BODY MASS INDEX: 21.16 KG/M2 | HEIGHT: 65 IN | HEART RATE: 73 BPM | SYSTOLIC BLOOD PRESSURE: 156 MMHG | WEIGHT: 127 LBS | OXYGEN SATURATION: 99 % | TEMPERATURE: 98.5 F

## 2023-01-25 DIAGNOSIS — R26.89 OTHER ABNORMALITIES OF GAIT AND MOBILITY: ICD-10-CM

## 2023-01-25 DIAGNOSIS — G25.0 ESSENTIAL TREMOR: ICD-10-CM

## 2023-01-25 PROCEDURE — 99214 OFFICE O/P EST MOD 30 MIN: CPT

## 2023-03-02 ENCOUNTER — APPOINTMENT (OUTPATIENT)
Dept: NEUROLOGY | Facility: CLINIC | Age: 68
End: 2023-03-02
Payer: MEDICARE

## 2023-03-02 VITALS
SYSTOLIC BLOOD PRESSURE: 144 MMHG | DIASTOLIC BLOOD PRESSURE: 84 MMHG | HEIGHT: 65 IN | OXYGEN SATURATION: 95 % | BODY MASS INDEX: 20.99 KG/M2 | HEART RATE: 70 BPM | TEMPERATURE: 97.4 F | WEIGHT: 126 LBS

## 2023-03-02 PROCEDURE — 95970 ALYS NPGT W/O PRGRMG: CPT

## 2023-03-02 PROCEDURE — 99214 OFFICE O/P EST MOD 30 MIN: CPT | Mod: 25

## 2023-03-02 NOTE — END OF VISIT
[] : Fellow [FreeTextEntry3] : Patient's tremor is stable with some noted improvement with addition of zonsimide. States that he can shave himself now. Able to eat with right hand. Gait is unsteady but has not fallen. Tolerating GBP, higher dose of zonisamide triggered dizziness. \par \par On exam: Mild vocal tremor. Rest tremor absent. There is minimal PT on right and 2+ left PT. There is 2+ KT L>R, Kristal do not decrement. Rigidity absent. Walks with narrow base of with reduced SL. Steps off on tandem\par \par Imp: ET with L czi DBS who is stable. Will leave stim parameters unchanged. Cont GBP 600mg TID  Continue current sz regimen per Dr Hawthorne. \par f/u 6months [Time Spent: ___ minutes] : I have spent [unfilled] minutes of time on the encounter.

## 2023-03-02 NOTE — HISTORY OF PRESENT ILLNESS
[FreeTextEntry1] : - Overall he feels stable\par Could not tolerate the zonisamide increase d/t dizziness. increased dose caused worse tremor cutting himself while shaving. now able to himself\par - feels off balance, and no falls\par - no depression or anxiety\par - sleep is good\par -\par Meds\par GBP: 600mg TID\par Brivact 125mg BID\par Zonisamide 50mg nightly \par \par

## 2023-03-02 NOTE — DISCUSSION/SUMMARY
[FreeTextEntry1] : ET with L Czi DBS with stable tremors since last visit 6 months ago.\par Transient vertigo appears related to BPPV and has resolved.\par Overall he appears to be stable with his tremors. He feels better with the addition of zonisamide but could not tolerate 100mg dose. Overall he is stable and we will stay the course without any changes.\par \par Patient was counseled on the following recommendations:\par DBS unchanged \par c/w current doses of meds as in HPI\par c/w follow up with Dr. Hawthorne\par \par f/u 4months. \par \par Case discussed and pt examined with movement attending Dr. Scott\par \par Onel Olivier MD\par Movement Fellow

## 2023-03-02 NOTE — PHYSICAL EXAM
[FreeTextEntry1] : No bradykinesia, or rigidity\par No rest tremor\par \par Mild vocal tremor. mild jaw tremor\par Moderate tremor postural and positional more on the Lt, slight on the right\par walking was narrow and cautious\par tandem was poor\par

## 2023-03-02 NOTE — PROCEDURE
[FreeTextEntry1] : Deep Brain Stimulation Programming:MDT\par \par Left %\par 0-1+:5.4ma/150/140\par Impedances:ok\par Battery: 100%\par \par Notes: \par Final settings: unchanged\par \par DBS programming 7mins\par \par

## 2023-05-10 ENCOUNTER — APPOINTMENT (OUTPATIENT)
Dept: NEUROLOGY | Facility: CLINIC | Age: 68
End: 2023-05-10
Payer: MEDICARE

## 2023-05-10 VITALS
TEMPERATURE: 98.4 F | OXYGEN SATURATION: 95 % | WEIGHT: 129 LBS | BODY MASS INDEX: 21.49 KG/M2 | SYSTOLIC BLOOD PRESSURE: 167 MMHG | DIASTOLIC BLOOD PRESSURE: 82 MMHG | HEIGHT: 65 IN | HEART RATE: 77 BPM

## 2023-05-10 PROCEDURE — 99214 OFFICE O/P EST MOD 30 MIN: CPT

## 2023-06-05 ENCOUNTER — APPOINTMENT (OUTPATIENT)
Dept: NEUROLOGY | Facility: CLINIC | Age: 68
End: 2023-06-05
Payer: MEDICARE

## 2023-06-05 PROCEDURE — 95819 EEG AWAKE AND ASLEEP: CPT

## 2023-06-06 PROCEDURE — 95708 EEG WO VID EA 12-26HR UNMNTR: CPT

## 2023-06-07 ENCOUNTER — APPOINTMENT (OUTPATIENT)
Dept: NEUROLOGY | Facility: CLINIC | Age: 68
End: 2023-06-07

## 2023-06-07 PROCEDURE — 95708 EEG WO VID EA 12-26HR UNMNTR: CPT

## 2023-06-07 PROCEDURE — 95700 EEG CONT REC W/VID EEG TECH: CPT

## 2023-06-07 PROCEDURE — 95721 EEG PHY/QHP>36<60 HR W/O VID: CPT

## 2023-09-01 RX ORDER — GABAPENTIN 600 MG/1
600 TABLET, COATED ORAL 3 TIMES DAILY
Qty: 90 | Refills: 5 | Status: ACTIVE | COMMUNITY
Start: 1900-01-01 | End: 1900-01-01

## 2023-09-07 ENCOUNTER — APPOINTMENT (OUTPATIENT)
Dept: NEUROLOGY | Facility: CLINIC | Age: 68
End: 2023-09-07
Payer: MEDICARE

## 2023-09-07 VITALS
BODY MASS INDEX: 19.83 KG/M2 | HEART RATE: 62 BPM | DIASTOLIC BLOOD PRESSURE: 71 MMHG | SYSTOLIC BLOOD PRESSURE: 136 MMHG | WEIGHT: 119 LBS | TEMPERATURE: 98.4 F | OXYGEN SATURATION: 98 % | HEIGHT: 65 IN

## 2023-09-07 PROCEDURE — 95970 ALYS NPGT W/O PRGRMG: CPT

## 2023-09-07 PROCEDURE — 99214 OFFICE O/P EST MOD 30 MIN: CPT | Mod: 25

## 2023-09-07 NOTE — PHYSICAL EXAM
[FreeTextEntry1] : EOMI. Mild VT on phonation. There are no tremors at rest., There is no postural tremor on the right and 3+ on the left. There is 1+ action tremor on finger to nose with the right hand and 3+ with left hand. Handwriting is larger and less tremulous.  Walks with narrow base of station but slow. Tandem is mildly unsteady.

## 2023-09-07 NOTE — DISCUSSION/SUMMARY
[FreeTextEntry1] : ET s/p L VIM DBS with stable right hand tremor. ZNS may be provided some adding benefit for his tremor as well. Left hand tremor remains severe and unable to do much activity with it as a result.   Patient was counseled on the following recommendations: DBS not changed today cont current MED regimen  f/u 4months

## 2023-09-07 NOTE — PROCEDURE
[FreeTextEntry1] : Deep Brain Stimulation Programming:  Left DBS  0-1+:5.4ma/140/150 Impedances:ok Battery: 75%  Final settings: unchanged  DBS programming 7mins

## 2023-09-07 NOTE — HISTORY OF PRESENT ILLNESS
[FreeTextEntry1] : Patient says that his right hand tremor is stable. He is able to shave and feed himself. Left hand tremor remains bothersome Can do ADLs with minimal assistance but needs help with IADLs and taking his medications He has not fallen since his last visit and continues to use a cane for balance.  He is unable to write or sign his name. Does not spill frequently when using utensils.  Has not had recent seizures; ZNS was added by  Not doing PT currently.  Meds ZNS 50mg qd GBP:600mg TID Brivact 100mg BID

## 2024-03-26 ENCOUNTER — APPOINTMENT (OUTPATIENT)
Dept: NEUROLOGY | Facility: CLINIC | Age: 69
End: 2024-03-26
Payer: MEDICARE

## 2024-03-26 VITALS
WEIGHT: 126 LBS | TEMPERATURE: 98.3 F | BODY MASS INDEX: 20.99 KG/M2 | HEART RATE: 69 BPM | OXYGEN SATURATION: 96 % | HEIGHT: 65 IN

## 2024-03-26 VITALS — SYSTOLIC BLOOD PRESSURE: 165 MMHG | DIASTOLIC BLOOD PRESSURE: 82 MMHG

## 2024-03-26 DIAGNOSIS — G40.909 EPILEPSY, UNSPECIFIED, NOT INTRACTABLE, W/OUT STATUS EPILEPTICUS: ICD-10-CM

## 2024-03-26 DIAGNOSIS — R41.89 OTHER SYMPTOMS AND SIGNS INVOLVING COGNITIVE FUNCTIONS AND AWARENESS: ICD-10-CM

## 2024-03-26 PROCEDURE — 99214 OFFICE O/P EST MOD 30 MIN: CPT

## 2024-03-26 PROCEDURE — G2211 COMPLEX E/M VISIT ADD ON: CPT

## 2024-03-26 RX ORDER — BRIVARACETAM 100 MG/1
100 TABLET, FILM COATED ORAL
Qty: 60 | Refills: 3 | Status: ACTIVE | COMMUNITY
Start: 2022-12-09 | End: 1900-01-01

## 2024-03-26 RX ORDER — BRIVARACETAM 25 MG/1
25 TABLET, FILM COATED ORAL
Qty: 60 | Refills: 3 | Status: ACTIVE | COMMUNITY
Start: 1900-01-01 | End: 1900-01-01

## 2024-03-26 RX ORDER — ZONISAMIDE 100 MG/1
100 CAPSULE ORAL
Qty: 30 | Refills: 3 | Status: ACTIVE | COMMUNITY
Start: 2022-08-24 | End: 1900-01-01

## 2024-03-26 NOTE — HISTORY OF PRESENT ILLNESS
[FreeTextEntry1] : Interim Hx: 3/26/2024  Wife reports 3 small seizures, described as staring since last visit. Most recently last week Did not increase ZNS as discussed last visit  Current AEDs: ZNS 50mg/night,  Briviact 125mg BID and GBP 600mg TID (for tremors per patient) No side effects  6/5-6/6/2023- Occasional to frequent sleep activated right temporal sharp waves  Also reporting some trouble with memory Annual with PMD in April ______________________ Interim Hx: 5/10/2023  One small seizure since last visit, occurred few weeks go in the shower. Wife reports he became briefly confused.  Did not increase ZNS to 50mg BID as discussed last visit. Tolerating ZNS 50mg/daily, no longer having side effects.  ____________________ Interim Hx: 1/25/2023  Wife reports that since august patient has had 3 small seizures, most recent 2 weeks ago. seizures described as staring, lasts seconds. No convulsive seizures. Started ZNS 50mg after last visit for 2 weeks then he tried increasing to 100mg for 1 day and felt unsteady so went back to 50mg/night.   Current AEDs: ZNS 50mg/night,  Briviact 125mg BID and GBP 600mg TID for tremors per patient)  Patient report that tremors have improved with the addition of ZNS. He has been unable to shave for the past 5 years b/c of tremors (wife would do it). He has been shaving the past few months bc tremors better controlled. __________________ Interim Hx: 8/24/2022  Patient admitted to [facility] 8/17/2022 EEG showed frequent right temporal sharp discharges and slowing. Briviact was increased to 150mg BID. Patient reports that he only took the higher dose for 2 days. He felt it was making him too dizzy so stopped it. He resumed his previous dose of 150mg BID and is feeling better.  Previous med trials he can recall- Dilantin, Phenobarbital, Keppra ________________ Initial Hx: 7/13/2022 66 yo M CT s/p DBS placement and epilepsy presents for epilepsy management  Patient reports that first seizure was 5 years ago Seizures described as generalized tonic clonic seizures, at times a/w with tongue bite He believes that the frequency has been increasing and now occurs every month or so. He says he wife will say that he "got sick" and he will not know what happened.  Previously followed with Neurologist Dr. Barbara Koeppel at [facility] for his seizures. She has reportedly retired. No records currently available.  Patient does not recall all previous med tried- recalls LEV and Dilantin but does not know why it was changed. Also not sure when his medication doses were changed.  Current meds: Briviact 125mg BID GBP 600mg TID - patient says this this is for tremors   He follows with Dr. Jordan Movement d/o specialist for essential tremors s/p DBS

## 2024-03-26 NOTE — DISCUSSION/SUMMARY
[FreeTextEntry1] : 68 yo M CT s/p DBS placement and epilepsy presents for epilepsy management. Also with some memory changes EMU 8/17/2022- EEG showed frequent right temporal sharp discharges and slowing.  plan: Again instructed to increase ZNS to 100mg/night Continue Briviact 125mg BID and GBP 600mg TID 48HR ambulatory EEG Check b12 and tsh with annual blood work F/u in 4-5 months

## 2024-03-26 NOTE — PHYSICAL EXAM
[General Appearance - Alert] : alert [General Appearance - In No Acute Distress] : in no acute distress [Person] : oriented to person [Place] : oriented to place [Time] : oriented to time [Fluency] : fluency intact [Cranial Nerves Oculomotor (III)] : extraocular motion intact [Cranial Nerves Facial (VII)] : face symmetrical [Cranial Nerves Vestibulocochlear (VIII)] : hearing was intact bilaterally [Cranial Nerves Accessory (XI - Cranial And Spinal)] : head turning and shoulder shrug symmetric [Motor Strength] : muscle strength was normal in all four extremities [Sensation Tactile Decrease] : light touch was intact [Abnormal Walk] : normal gait [FreeTextEntry8] : tremor with outstretched hands L>>R

## 2024-03-27 LAB
FOLATE SERPL-MCNC: 18.9 NG/ML
TSH SERPL-ACNC: 1.09 UIU/ML
VIT B12 SERPL-MCNC: 622 PG/ML

## 2024-06-06 ENCOUNTER — APPOINTMENT (OUTPATIENT)
Dept: NEUROLOGY | Facility: CLINIC | Age: 69
End: 2024-06-06
Payer: MEDICARE

## 2024-06-06 VITALS
WEIGHT: 127 LBS | BODY MASS INDEX: 21.16 KG/M2 | OXYGEN SATURATION: 96 % | TEMPERATURE: 98.1 F | HEART RATE: 70 BPM | HEIGHT: 65 IN | SYSTOLIC BLOOD PRESSURE: 154 MMHG | DIASTOLIC BLOOD PRESSURE: 83 MMHG

## 2024-06-06 PROCEDURE — 95970 ALYS NPGT W/O PRGRMG: CPT

## 2024-06-06 PROCEDURE — 99214 OFFICE O/P EST MOD 30 MIN: CPT | Mod: 25

## 2024-06-06 NOTE — PHYSICAL EXAM
[FreeTextEntry1] : EOMI. Mild VT on phonation. There are no tremors at rest., There is no postural tremor on the right and 3+ on the left. There is 1+ action tremor on finger to nose with the right hand and 3+ with left hand. Handwriting is larger and less tremulous.  Walks with narrow base of station but slow. Tandem is mildly unsteady. 
[address] Detailed

## 2024-06-06 NOTE — HISTORY OF PRESENT ILLNESS
[FreeTextEntry1] : Since last visit the patient feels  tremors are getting worse in both hands does not spill food or drinks with the right hand complains of difficulty managing ADLs [ ex. buttoning the shirt]- happens 2 days a week  balance is poor, uses a cane for ambulation for the last 7 months  Has not had recent seizures; follow by  Not doing PT currently.    Meds ZNS 100mg qd GBP:600mg TID Brivact 126mg BID

## 2024-06-06 NOTE — DISCUSSION/SUMMARY
[FreeTextEntry1] : ET s/p L VIM DBS with stable right hand tremor and worsening left hand tremor.  On Zonisamide and briviact for epilepsy. advised to increase the Gabapentin with the first two doses 700-700- 600   Patient was counseled on the following recommendations: DBS not changed today Increase the gabapentin first two doses to [phone]  f/u 4months

## 2024-06-06 NOTE — PROCEDURE
[FreeTextEntry1] : Deep Brain Stimulation Programming:  Left DBS  0-1+:5.4ma/140/150 Impedances:ok Battery: 100% [ rechargeable battery]  Final settings: unchanged  DBS programming 7mins

## 2024-06-24 ENCOUNTER — APPOINTMENT (OUTPATIENT)
Dept: NEUROLOGY | Facility: CLINIC | Age: 69
End: 2024-06-24
Payer: MEDICARE

## 2024-06-24 PROCEDURE — 95816 EEG AWAKE AND DROWSY: CPT

## 2024-06-25 PROCEDURE — 95708 EEG WO VID EA 12-26HR UNMNTR: CPT

## 2024-06-26 ENCOUNTER — APPOINTMENT (OUTPATIENT)
Dept: NEUROLOGY | Facility: CLINIC | Age: 69
End: 2024-06-26

## 2024-06-26 PROCEDURE — 95708 EEG WO VID EA 12-26HR UNMNTR: CPT

## 2024-06-26 PROCEDURE — 95721 EEG PHY/QHP>36<60 HR W/O VID: CPT

## 2024-06-26 PROCEDURE — 95700 EEG CONT REC W/VID EEG TECH: CPT

## 2024-06-26 RX ORDER — GABAPENTIN 100 MG/1
100 CAPSULE ORAL
Qty: 60 | Refills: 3 | Status: ACTIVE | COMMUNITY
Start: 2024-06-06 | End: 1900-01-01

## 2024-08-06 ENCOUNTER — INPATIENT (INPATIENT)
Facility: HOSPITAL | Age: 69
LOS: 0 days | Discharge: HOME | End: 2024-08-07
Attending: PSYCHIATRY & NEUROLOGY | Admitting: PSYCHIATRY & NEUROLOGY
Payer: MEDICARE

## 2024-08-06 VITALS
WEIGHT: 126.99 LBS | HEIGHT: 68 IN | OXYGEN SATURATION: 99 % | TEMPERATURE: 98 F | DIASTOLIC BLOOD PRESSURE: 125 MMHG | SYSTOLIC BLOOD PRESSURE: 170 MMHG | HEART RATE: 58 BPM | RESPIRATION RATE: 18 BRPM

## 2024-08-06 DIAGNOSIS — Z96.89 PRESENCE OF OTHER SPECIFIED FUNCTIONAL IMPLANTS: Chronic | ICD-10-CM

## 2024-08-06 LAB
ALBUMIN SERPL ELPH-MCNC: 4.9 G/DL — SIGNIFICANT CHANGE UP (ref 3.3–5)
ALP SERPL-CCNC: 82 U/L — SIGNIFICANT CHANGE UP (ref 40–120)
ALT FLD-CCNC: 14 U/L — SIGNIFICANT CHANGE UP (ref 10–45)
AMPHET UR-MCNC: NEGATIVE — SIGNIFICANT CHANGE UP
ANION GAP SERPL CALC-SCNC: 11 MMOL/L — SIGNIFICANT CHANGE UP (ref 5–17)
APPEARANCE UR: CLEAR — SIGNIFICANT CHANGE UP
APTT BLD: 33.4 SEC — SIGNIFICANT CHANGE UP (ref 24.5–35.6)
AST SERPL-CCNC: 32 U/L — SIGNIFICANT CHANGE UP (ref 10–40)
BARBITURATES UR SCN-MCNC: NEGATIVE — SIGNIFICANT CHANGE UP
BASOPHILS # BLD AUTO: 0.04 K/UL — SIGNIFICANT CHANGE UP (ref 0–0.2)
BASOPHILS NFR BLD AUTO: 0.7 % — SIGNIFICANT CHANGE UP (ref 0–2)
BENZODIAZ UR-MCNC: NEGATIVE — SIGNIFICANT CHANGE UP
BILIRUB SERPL-MCNC: 0.6 MG/DL — SIGNIFICANT CHANGE UP (ref 0.2–1.2)
BILIRUB UR-MCNC: NEGATIVE — SIGNIFICANT CHANGE UP
BUN SERPL-MCNC: 10 MG/DL — SIGNIFICANT CHANGE UP (ref 7–23)
CALCIUM SERPL-MCNC: 9.6 MG/DL — SIGNIFICANT CHANGE UP (ref 8.4–10.5)
CHLORIDE SERPL-SCNC: 100 MMOL/L — SIGNIFICANT CHANGE UP (ref 96–108)
CO2 SERPL-SCNC: 25 MMOL/L — SIGNIFICANT CHANGE UP (ref 22–31)
COCAINE METAB.OTHER UR-MCNC: NEGATIVE — SIGNIFICANT CHANGE UP
COLOR SPEC: YELLOW — SIGNIFICANT CHANGE UP
CREAT SERPL-MCNC: 0.82 MG/DL — SIGNIFICANT CHANGE UP (ref 0.5–1.3)
DIFF PNL FLD: NEGATIVE — SIGNIFICANT CHANGE UP
EGFR: 94 ML/MIN/1.73M2 — SIGNIFICANT CHANGE UP
EOSINOPHIL # BLD AUTO: 0.02 K/UL — SIGNIFICANT CHANGE UP (ref 0–0.5)
EOSINOPHIL NFR BLD AUTO: 0.3 % — SIGNIFICANT CHANGE UP (ref 0–6)
FENTANYL UR QL SCN: NEGATIVE — SIGNIFICANT CHANGE UP
GLUCOSE SERPL-MCNC: 125 MG/DL — HIGH (ref 70–99)
GLUCOSE UR QL: NEGATIVE MG/DL — SIGNIFICANT CHANGE UP
HCT VFR BLD CALC: 43.9 % — SIGNIFICANT CHANGE UP (ref 39–50)
HGB BLD-MCNC: 14.7 G/DL — SIGNIFICANT CHANGE UP (ref 13–17)
IMM GRANULOCYTES NFR BLD AUTO: 0.2 % — SIGNIFICANT CHANGE UP (ref 0–0.9)
INR BLD: 1.01 — SIGNIFICANT CHANGE UP (ref 0.85–1.18)
KETONES UR-MCNC: NEGATIVE MG/DL — SIGNIFICANT CHANGE UP
LEUKOCYTE ESTERASE UR-ACNC: NEGATIVE — SIGNIFICANT CHANGE UP
LYMPHOCYTES # BLD AUTO: 1.87 K/UL — SIGNIFICANT CHANGE UP (ref 1–3.3)
LYMPHOCYTES # BLD AUTO: 31.6 % — SIGNIFICANT CHANGE UP (ref 13–44)
MCHC RBC-ENTMCNC: 31.5 PG — SIGNIFICANT CHANGE UP (ref 27–34)
MCHC RBC-ENTMCNC: 33.5 GM/DL — SIGNIFICANT CHANGE UP (ref 32–36)
MCV RBC AUTO: 94.2 FL — SIGNIFICANT CHANGE UP (ref 80–100)
METHADONE UR-MCNC: NEGATIVE — SIGNIFICANT CHANGE UP
MONOCYTES # BLD AUTO: 0.51 K/UL — SIGNIFICANT CHANGE UP (ref 0–0.9)
MONOCYTES NFR BLD AUTO: 8.6 % — SIGNIFICANT CHANGE UP (ref 2–14)
NEUTROPHILS # BLD AUTO: 3.47 K/UL — SIGNIFICANT CHANGE UP (ref 1.8–7.4)
NEUTROPHILS NFR BLD AUTO: 58.6 % — SIGNIFICANT CHANGE UP (ref 43–77)
NITRITE UR-MCNC: NEGATIVE — SIGNIFICANT CHANGE UP
NRBC # BLD: 0 /100 WBCS — SIGNIFICANT CHANGE UP (ref 0–0)
OPIATES UR-MCNC: NEGATIVE — SIGNIFICANT CHANGE UP
PCP SPEC-MCNC: SIGNIFICANT CHANGE UP
PCP UR-MCNC: NEGATIVE — SIGNIFICANT CHANGE UP
PH UR: 7.5 — SIGNIFICANT CHANGE UP (ref 5–8)
PLATELET # BLD AUTO: 206 K/UL — SIGNIFICANT CHANGE UP (ref 150–400)
POTASSIUM SERPL-MCNC: 5.1 MMOL/L — SIGNIFICANT CHANGE UP (ref 3.5–5.3)
POTASSIUM SERPL-SCNC: 5.1 MMOL/L — SIGNIFICANT CHANGE UP (ref 3.5–5.3)
PROT SERPL-MCNC: 8.4 G/DL — HIGH (ref 6–8.3)
PROT UR-MCNC: NEGATIVE MG/DL — SIGNIFICANT CHANGE UP
PROTHROM AB SERPL-ACNC: 11.5 SEC — SIGNIFICANT CHANGE UP (ref 9.5–13)
RBC # BLD: 4.66 M/UL — SIGNIFICANT CHANGE UP (ref 4.2–5.8)
RBC # FLD: 13.2 % — SIGNIFICANT CHANGE UP (ref 10.3–14.5)
SODIUM SERPL-SCNC: 136 MMOL/L — SIGNIFICANT CHANGE UP (ref 135–145)
SP GR SPEC: 1.03 — SIGNIFICANT CHANGE UP (ref 1–1.03)
THC UR QL: POSITIVE
TROPONIN T, HIGH SENSITIVITY RESULT: 14 NG/L — SIGNIFICANT CHANGE UP (ref 0–51)
UROBILINOGEN FLD QL: 0.2 MG/DL — SIGNIFICANT CHANGE UP (ref 0.2–1)
WBC # BLD: 5.92 K/UL — SIGNIFICANT CHANGE UP (ref 3.8–10.5)
WBC # FLD AUTO: 5.92 K/UL — SIGNIFICANT CHANGE UP (ref 3.8–10.5)

## 2024-08-06 PROCEDURE — 99285 EMERGENCY DEPT VISIT HI MDM: CPT

## 2024-08-06 PROCEDURE — 71045 X-RAY EXAM CHEST 1 VIEW: CPT | Mod: 26

## 2024-08-06 PROCEDURE — 70450 CT HEAD/BRAIN W/O DYE: CPT | Mod: 26,XU,MC

## 2024-08-06 PROCEDURE — 0042T: CPT | Mod: MC

## 2024-08-06 PROCEDURE — 70496 CT ANGIOGRAPHY HEAD: CPT | Mod: 26,MC

## 2024-08-06 PROCEDURE — 93010 ELECTROCARDIOGRAM REPORT: CPT

## 2024-08-06 PROCEDURE — 70498 CT ANGIOGRAPHY NECK: CPT | Mod: 26,MC

## 2024-08-06 RX ORDER — AMLODIPINE BESYLATE 2.5 MG/1
5 TABLET ORAL DAILY
Refills: 0 | Status: DISCONTINUED | OUTPATIENT
Start: 2024-08-07 | End: 2024-08-07

## 2024-08-06 RX ORDER — BRIVARACETAM 100 MG/1
1 TABLET, FILM COATED ORAL
Refills: 0 | DISCHARGE

## 2024-08-06 RX ORDER — BRIVARACETAM 100 MG/1
100 TABLET, FILM COATED ORAL EVERY 12 HOURS
Refills: 0 | Status: DISCONTINUED | OUTPATIENT
Start: 2024-08-06 | End: 2024-08-07

## 2024-08-06 RX ORDER — GABAPENTIN 400 MG/1
600 CAPSULE ORAL EVERY 8 HOURS
Refills: 0 | Status: DISCONTINUED | OUTPATIENT
Start: 2024-08-06 | End: 2024-08-07

## 2024-08-06 RX ORDER — ENOXAPARIN SODIUM 120 MG/.8ML
40 INJECTION SUBCUTANEOUS EVERY 24 HOURS
Refills: 0 | Status: DISCONTINUED | OUTPATIENT
Start: 2024-08-06 | End: 2024-08-07

## 2024-08-06 RX ORDER — ASPIRIN 500 MG
81 TABLET ORAL DAILY
Refills: 0 | Status: DISCONTINUED | OUTPATIENT
Start: 2024-08-06 | End: 2024-08-07

## 2024-08-06 RX ORDER — BRIVARACETAM 100 MG/1
25 TABLET, FILM COATED ORAL EVERY 12 HOURS
Refills: 0 | Status: DISCONTINUED | OUTPATIENT
Start: 2024-08-06 | End: 2024-08-07

## 2024-08-06 RX ORDER — AMLODIPINE BESYLATE 2.5 MG/1
1 TABLET ORAL
Refills: 0 | DISCHARGE

## 2024-08-06 RX ORDER — BRIVARACETAM 100 MG/1
125 TABLET, FILM COATED ORAL ONCE
Refills: 0 | Status: DISCONTINUED | OUTPATIENT
Start: 2024-08-06 | End: 2024-08-06

## 2024-08-06 RX ORDER — GABAPENTIN 400 MG/1
1 CAPSULE ORAL
Refills: 0 | DISCHARGE

## 2024-08-06 RX ORDER — ATORVASTATIN CALCIUM 40 MG/1
80 TABLET, FILM COATED ORAL AT BEDTIME
Refills: 0 | Status: DISCONTINUED | OUTPATIENT
Start: 2024-08-06 | End: 2024-08-07

## 2024-08-06 RX ORDER — GABAPENTIN 400 MG/1
600 CAPSULE ORAL ONCE
Refills: 0 | Status: COMPLETED | OUTPATIENT
Start: 2024-08-06 | End: 2024-08-06

## 2024-08-06 RX ORDER — ZONISAMIDE 100 MG/1
50 CAPSULE ORAL EVERY 12 HOURS
Refills: 0 | Status: DISCONTINUED | OUTPATIENT
Start: 2024-08-06 | End: 2024-08-07

## 2024-08-06 RX ADMIN — BRIVARACETAM 125 MILLIGRAM(S): 100 TABLET, FILM COATED ORAL at 16:13

## 2024-08-06 RX ADMIN — GABAPENTIN 600 MILLIGRAM(S): 400 CAPSULE ORAL at 23:29

## 2024-08-06 RX ADMIN — ENOXAPARIN SODIUM 40 MILLIGRAM(S): 120 INJECTION SUBCUTANEOUS at 21:41

## 2024-08-06 RX ADMIN — ATORVASTATIN CALCIUM 80 MILLIGRAM(S): 40 TABLET, FILM COATED ORAL at 21:41

## 2024-08-06 RX ADMIN — BRIVARACETAM 100 MILLIGRAM(S): 100 TABLET, FILM COATED ORAL at 23:29

## 2024-08-06 RX ADMIN — BRIVARACETAM 25 MILLIGRAM(S): 100 TABLET, FILM COATED ORAL at 23:29

## 2024-08-06 RX ADMIN — ZONISAMIDE 50 MILLIGRAM(S): 100 CAPSULE ORAL at 22:57

## 2024-08-06 RX ADMIN — GABAPENTIN 600 MILLIGRAM(S): 400 CAPSULE ORAL at 14:14

## 2024-08-06 RX ADMIN — Medication 81 MILLIGRAM(S): at 21:42

## 2024-08-06 NOTE — ED PROVIDER NOTE - PROGRESS NOTE DETAILS
pt had ~1-2 episode of expressive aphasia while in ed, gradually improved.  stroke team reconsulted.  will continue to monitor and admit for stroke hoang and monitoring.  ddx includes tia vs sz.

## 2024-08-06 NOTE — H&P ADULT - NSHPREVIEWOFSYSTEMS_GEN_ALL_CORE
ROS:   Constitutional: No fever, weight loss or fatigue  Eyes: No eye pain, visual disturbances, or discharge  ENMT:  No difficulty hearing, tinnitus, vertigo; No sinus or throat pain  Neck: No pain or stiffness  Respiratory: No cough, wheezing, chills or hemoptysis  Cardiovascular: No chest pain, palpitations, shortness of breath, dizziness or leg swelling  Gastrointestinal: No abdominal pain. No nausea, vomiting   Genitourinary: No dysuria, frequency, hematuria or incontinence  Neurological: As per HPI

## 2024-08-06 NOTE — H&P ADULT - NSHPSOCIALHISTORY_GEN_ALL_CORE
SOCIAL HISTORY:   Patient lives with wife, is supposed to walk with walker but walks with cane   Smoking status: denies   Drinking: denies   Drug Use: denies

## 2024-08-06 NOTE — ED ADULT NURSE NOTE - OBJECTIVE STATEMENT
n/a Pt is a 68yo male PMHx seizures presents to ED c/o altered mental status. Wife reports pt began having an altered mental status at 1000 this morning, pt states, "I thought it was time to charge my brain implant device but I had the day wrong, I felt confused." Stroke code activated,  in triage, pt brought to CT scan on transport monitor, large bore IV access obtained and labs sent, weight obtained of 50.6kg. Pt is A&Ox2, disoriented to month, breathing even and unlabored speaking in clear full sentences, denies lightheadedness, dizziness, numbness, tingling, c/p, sob, f/c.

## 2024-08-06 NOTE — PATIENT PROFILE ADULT - SAFE PLACE TO LIVE
"Anesthesia Transfer of Care Note    Patient: Karlene Damon    Procedure(s) Performed: Procedure(s) (LRB):  ARTHROSCOPY-KNEE (Right)    Patient location: PACU    Anesthesia Type: general    Transport from OR: Transported from OR on 2-3 L/min O2 by NC with adequate spontaneous ventilation    Post pain: adequate analgesia    Post assessment: no apparent anesthetic complications and tolerated procedure well    Post vital signs: stable    Level of consciousness: responds to stimulation and sedated    Nausea/Vomiting: no nausea/vomiting    Complications: none    Transfer of care protocol was followed      Last vitals:   Visit Vitals  /81   Pulse 101   Temp 36.7 °C (98.1 °F) (Temporal)   Resp 16   Ht 5' 1.5" (1.562 m)   Wt 72.6 kg (160 lb)   Breastfeeding? No   BMI 29.74 kg/m²     " no

## 2024-08-06 NOTE — CONSULT NOTE ADULT - ASSESSMENT
69y Male with PMHx of epilepsy on AED therapy follows Dr. Hawthorne, essential tremors s/p deep brain stimulator follows Dr. Scott, s/p defibrillator presents to [facility] ER for 1 hour of confusion now returned to baseline, stroke code called negative for stroke.  Patient was instructed to be on zonisamide 100mg QHS, however, only takes 50mg QHS as the increased dose makes him dizzy and drowsy (has taken it at this reduced dose for >1 month).  Seizures are generalized tonic-clonic as well as staring off briefly; he is on Briviact 125mg BID and Zonisamide 100mg qHS but has only been taking Zonisamide 50mg qHS and has had seizures in the recent past as a result.  Although his transient confusion that has since improved does not fit with his typical seizure presentation, he is poorly controlled due to not adhering to his prescribed regimen.       69y Male with PMHx of epilepsy on AED therapy follows Dr. Hawthorne, essential tremors s/p deep brain stimulator follows Dr. Scott, s/p defibrillator presents to [facility] ER for 1 hour of confusion now returned to baseline, stroke code called negative for stroke.  Patient was instructed to be on zonisamide 100mg QHS, however, only takes 50mg QHS as the increased dose makes him dizzy and drowsy (has taken it at this reduced dose for >1 month).  Seizures are generalized tonic-clonic as well as staring off briefly; he is on Briviact 125mg BID and Zonisamide 100mg qHS but has only been taking Zonisamide 50mg qHS and has had seizures in the recent past as a result.  Although his transient confusion that has since improved does not fit with his typical seizure presentation, he is poorly controlled due to not adhering to his prescribed regimen.    Recommendations:  - patient should take Zonisamide 100mg qHS as prescribed (this can be split into two doses of 50 if pt finds this more tolerable)  - c/w Briviact 125mg BID  - seizure precautions  - follow up with Dr. Hawthorne, his outpatient epileptologist  - infection/metabolic workup  - no further epilepsy workup inpatient      Case discussed with epilepsy attending Dr. Hernadez as well as patient's outpatient neurologist Dr. Hawthorne.

## 2024-08-06 NOTE — CONSULT NOTE ADULT - ASSESSMENT
69y Male with PMHx of epilepsy on AED therapy follows Dr. Hawthorne, essential tremors s/p deep brain stimulator follows Dr. Scott, s/p defibrillator presents to [facility] ER for 1 hour of confusion now returning to baseline. Wife at bedside states pt woke up in his USOH, around 1000 pt was trying to charge his deep brain stimulator but it needs to be charged tomorrow, pt was also confused on how to charge it which is unusual for him as he is proficient in doing this independently. There was ?slowness in his speech during this point, was telling his daughter on the phone that he needs to go to the hospital because he felt off. Per wife, when EMS was testing year he answered incorrectly at home and in ambulance. BP on arrival to [facility] 170/125, Stroke code called. NIHSS 1. CTH negative for acute intracranial pathology, CTP with tmax >6 secs in right hemisphere 13mL but likely due to motion artifact, CTA H/N negative for stenosis or occlusion.     Impression: cause of transient confusion less likely due to vascular etiology given non-focal symptoms, would consider seizure given pt was instructed to be on zonisamide 100mg QHS however takes 50mg QHS given side effects.    Recommend:  - epilepsy consult  - toxic/metaoblic/infectious workup    No further stroke workup warranted at this time. Stroke to sign off.     Discussed with Stroke Attending Dr. Shelby Flecther

## 2024-08-06 NOTE — ED ADULT TRIAGE NOTE - CHIEF COMPLAINT QUOTE
Pt presents to ED by EMS C/O sudden onset AMS starting at 10 AM witnessed by Wife. Hx deep brain stimulator for hx tremor, blank stare seizure hx. Wife at bedside states, " He wasn't acting like himself but on the way here he started becoming more clear". BG in progress. Pt upg. Stroke code called by Triage RN.

## 2024-08-06 NOTE — H&P ADULT - ASSESSMENT
69y Male with PMHx of epilepsy on AED therapy follows Dr. Hawthorne, essential tremors s/p deep brain stimulator follows Dr. Scott, s/p defibrillator presents to [facility] ER for 1 hour of confusion now returning to baseline. Wife at bedside states pt woke up in his USOH, around 1000 pt was trying to charge his deep brain stimulator but it needs to be charged tomorrow, pt was also confused on how to charge it which is unusual for him as he is proficient in doing this independently. There was ?slowness in his speech during this point, was telling his daughter on the phone that he needs to go to the hospital because he felt off. Per wife, when EMS was testing year he answered incorrectly at home and in ambulance. BP on arrival to [facility] 170/125, Stroke code called. NIHSS 1. CTH negative for acute intracranial pathology, CTP with tmax >6 secs in right hemisphere 13mL but likely due to motion artifact, CTA H/N negative for stenosis or occlusion. Pt with transient episode of expressive aphasia in ED ~ 2 minutes and spontaneously resolved, wife noted pt had a similar episode 3 days ago lasting 15-30 seconds and resolved. Pt has never had his seizures present as expressive aphasia. Epilepsy consulted in ED, recommended to take his zonisamide as prescribed otherwise signed off. Admitted to stroke tele for eval seizure with EEG and possible MRI brain.     Neuro  #CVA workup  - start aspirin 81mg daily  - continue atorvastatin 80mg daily  - q4hr stroke neuro checks and vitals  - obtain MRI Brain without contrast if deep brain stimulator is MRI compatible   - Stroke Code HCT Results: neg  - Stroke Code CTA Results: neg   - Stroke education    #epilepsy  - continue briviact 125mg BID   - continue zonisamide as prescribed - 50mg BID per epilepsy as pt could not tolerate 100mg QHS    #essential tremor s/p deep brain stimulator  - confirm if deep brain stimulator is MRI compatible - outpt movement d/o Dr. Scott  - continue gabapentin 600mg TID    Cards  #HTN  - permissive hypertension, Goal -180  - continue home amlodipine 5mg daily   - obtain TTE     #HLD  - high dose statin as above in CVA  - LDL results: pending     Pulm  - call provider if SPO2 < 94%    GI  #Nutrition/Fluids/Electrolytes   - replete K<4 and Mg <2  - Diet: DASH/TLC     Renal  - monitor and trend Cr    Infectious Disease  - afebrile on admission, no leukocytosis    Endocrine  #DM  - A1C results: pending     - TSH results: pending     DVT Prophylaxis  - lovenox sq for DVT prophylaxis   - SCDs for DVT prophylaxis       IDR Goals: Goals reviewed at interdisciplinary rounds with case management, social work, physical therapy, occupational therapy, and speech language pathology.   Please see specific therapy  notes for in depth goals.  Dispo: pending PT/OT      Discussed daily hospital plans and goals with patient and family at bedside.    Discussed with Neurology Attending Dr. Fletcher  69y Male with PMHx of epilepsy on AED therapy follows Dr. Hawthorne, essential tremors s/p deep brain stimulator follows Dr. Scott, s/p defibrillator presents to [facility] ER for 1 hour of confusion now returning to baseline. Wife at bedside states pt woke up in his USOH, around 1000 pt was trying to charge his deep brain stimulator but it needs to be charged tomorrow, pt was also confused on how to charge it which is unusual for him as he is proficient in doing this independently. There was ?slowness in his speech during this point, was telling his daughter on the phone that he needs to go to the hospital because he felt off. Per wife, when EMS was testing year he answered incorrectly at home and in ambulance. BP on arrival to [facility] 170/125, Stroke code called. NIHSS 1. CTH negative for acute intracranial pathology, CTP with tmax >6 secs in right hemisphere 13mL but likely due to motion artifact, CTA H/N negative for stenosis or occlusion. Pt with transient episode of expressive aphasia in ED ~ 2 minutes and spontaneously resolved, wife noted pt had a similar episode 3 days ago lasting 15-30 seconds and resolved. Pt has never had his seizures present as expressive aphasia. Epilepsy consulted in ED, recommended to take his zonisamide as prescribed otherwise signed off. Admitted to stroke tele for eval seizure with EEG and possible MRI brain.     Neuro  #CVA workup  - start aspirin 81mg daily  - continue atorvastatin 80mg daily  - q4hr stroke neuro checks and vitals  - obtain MRI Brain without contrast if deep brain stimulator is MRI compatible   - Stroke Code HCT Results: neg  - Stroke Code CTA Results: neg   - Stroke education    #epilepsy  - continue briviact 125mg BID   - continue zonisamide as prescribed - 50mg BID per epilepsy as pt could not tolerate 100mg QHS  - f/u serum briviact and zonisamide levels     #essential tremor s/p deep brain stimulator  - confirm if deep brain stimulator is MRI compatible - outpt movement d/o Dr. Scott  - continue gabapentin 600mg TID    Cards  #HTN  - permissive hypertension, Goal -180  - continue home amlodipine 5mg daily   - obtain TTE     #HLD  - high dose statin as above in CVA  - LDL results: pending     Pulm  - call provider if SPO2 < 94%    GI  #Nutrition/Fluids/Electrolytes   - replete K<4 and Mg <2  - Diet: DASH/TLC     Renal  - monitor and trend Cr    Infectious Disease  - afebrile on admission, no leukocytosis    Endocrine  #DM  - A1C results: pending     - TSH results: pending     DVT Prophylaxis  - lovenox sq for DVT prophylaxis   - SCDs for DVT prophylaxis       IDR Goals: Goals reviewed at interdisciplinary rounds with case management, social work, physical therapy, occupational therapy, and speech language pathology.   Please see specific therapy  notes for in depth goals.  Dispo: pending PT/OT      Discussed daily hospital plans and goals with patient and family at bedside.    Discussed with Neurology Attending Dr. Fletcher

## 2024-08-06 NOTE — H&P ADULT - NS ATTEND AMEND GEN_ALL_CORE FT
The patient is a 69 year old male w known epilepsy (on AED, noncompliant) and essential tremor s/p DBS admitted after ? episode of aphasia in the ED following a transient spell of confusion which occurred before at home. After discussing spell with wife, she reports the spell in the ED was the patient’s familiar seizures and not a new type of symptom. She states he was staring and unresponsive. Etiology of transient confusion (otherwise neuro intact) unclear, possibly related to cluster of seizures.  No further stroke eval needed. Rec to take home meds per Dr. Hawthorne’s recs.

## 2024-08-06 NOTE — H&P ADULT - NSHPPHYSICALEXAM_GEN_ALL_CORE
Physical exam:  Constitutional: No acute distress, conversant  Eyes: Anicteric sclerae, moist conjunctivae, see below for CNs  Neck: trachea midline  Cardiovascular: Regular rate and rhythm  Pulmonary: No use of accessory muscles    Neurologic:  -Mental status: Awake, alert, oriented to person, place, and year (not to month). Non-fluent speech at baseline, with intact naming, repetition, and comprehension, no dysarthria. Follows commands. Attention/concentration intact. Fund of knowledge appropriate.  -Cranial nerves:   II: Visual fields are full to confrontation.  III, IV, VI: Extraocular movements are intact without nystagmus. Pupils equally round and reactive to light  V:  Facial sensation V1-V3 equal and intact   VII: Face is symmetric with normal eye closure and smile  VIII: Hearing is bilaterally intact to finger rub  IX, X: Uvula is midline and soft palate rises symmetrically  XI: Head turning and shoulder shrug are intact.  XII: Tongue protrudes midline  Motor: Normal bulk and tone. No pronator drift. Strength bilateral upper extremity 5/5, more pronounced essential tremor on LUE than RUE. bilateral lower extremities 5/5.  Sensation: Intact to light touch bilaterally. No neglect or extinction on double simultaneous testing.  Coordination: No dysmetria on finger-to-nose and heel-to-shin bilaterally. Essential tremor noted LUE.   Reflexes: Downgoing toes bilaterally   Gait: Narrow gait, baseline unsteady gait but not ataxic     NIHSS: 1

## 2024-08-06 NOTE — ED ADULT NURSE REASSESSMENT NOTE - NS ED NURSE REASSESS COMMENT FT1
Pt witnessed having seizure by family at bedside, RN and MD at bedside. Pt witnessed with baseline tremors, presenting as aphasic for 2min and unable to state names of simple objects.

## 2024-08-06 NOTE — ED PROVIDER NOTE - CLINICAL SUMMARY MEDICAL DECISION MAKING FREE TEXT BOX
Pt with change in MS prior to arrival, improved.  Nl neuro.  Pt stroke code at triage.  Noted results of testing.  Another episode of expressive aphasia noted.  Admit stroke hoang and epilepsy follow.

## 2024-08-06 NOTE — CONSULT NOTE ADULT - SUBJECTIVE AND OBJECTIVE BOX
**STROKE CODE CONSULT NOTE**    Last known well time/Time of onset of symptoms:    HPI: 69y Male with PMHx of epilepsy on AED therapy follows Dr. Hawthorne, essential tremors s/p deep brain stimulator follows Dr. Scott, presents to [facility] ER for 1 hour of confusion now returning to baseline. Wife at bedside states pt woke up in his USOH, around 1000     T(C): 36.8 (08-06-24 @ 11:28), Max: 36.8 (08-06-24 @ 11:28)  HR: 58 (08-06-24 @ 11:28) (58 - 58)  BP: 170/125 (08-06-24 @ 11:28) (170/125 - 170/125)  RR: 18 (08-06-24 @ 11:28) (18 - 18)  SpO2: 99% (08-06-24 @ 11:28) (99% - 99%)    PAST MEDICAL & SURGICAL HISTORY:  Epilepsy      Essential tremor      S/P deep brain stimulator placement          FAMILY HISTORY:      SOCIAL HISTORY:   Patient lives with *** at ***.   Smoking status:  Drinking:  Drug Use:     ROS: ***  Constitutional: No fever, weight loss or fatigue  Eyes: No eye pain, visual disturbances, or discharge  ENMT:  No difficulty hearing, tinnitus; No sinus or throat pain  Neck: No pain or stiffness  Respiratory: No cough, wheezing, chills or hemoptysis  Cardiovascular: No chest pain, palpitations, shortness of breath, or leg swelling  Gastrointestinal: No abdominal pain. No nausea, vomiting or hematemesis; No diarrhea or constipation. Nohematochezia.  Genitourinary: No dysuria, frequency, hematuria or incontinence  Neurological: As per HPI  Skin: No itching, burning, rashes or lesions   Endocrine: No heat or cold intolerance; No hair loss  Musculoskeletal: No joint pain or swelling; No muscle, back or extremity pain  Heme/Lymph: No easy bruising or bleeding gums    MEDICATIONS  (STANDING):    MEDICATIONS  (PRN):    Allergies    No Known Allergies    Intolerances      Vital Signs Last 24 Hrs  T(C): 36.8 (06 Aug 2024 11:28), Max: 36.8 (06 Aug 2024 11:28)  T(F): 98.2 (06 Aug 2024 11:28), Max: 98.2 (06 Aug 2024 11:28)  HR: 58 (06 Aug 2024 11:28) (58 - 58)  BP: 170/125 (06 Aug 2024 11:28) (170/125 - 170/125)  BP(mean): --  RR: 18 (06 Aug 2024 11:28) (18 - 18)  SpO2: 99% (06 Aug 2024 11:28) (99% - 99%)    Parameters below as of 06 Aug 2024 11:28  Patient On (Oxygen Delivery Method): room air        Physical exam:  Constitutional: No acute distress, conversant  Eyes: Anicteric sclerae, moist conjunctivae, see below for CNs  Neck: trachea midline, FROM, supple, no thyromegaly or lymphadenopathy  Cardiovascular: Regular rate and rhythm, no murmurs, rubs, or gallops. No carotid bruits.   Pulmonary: Anterior breath sounds clear bilaterally, no crackles or wheezing. No use of accessory muscles  GI: Abdomen soft, non-distended, non-tender  Extremities: Radial and DP pulses +2, no edema    Neurologic:  -Mental status: Awake, alert, oriented to person, place, and time. Speech is fluent with intact naming, repetition, and comprehension, no dysarthria. Recent and remote memory intact. Follows commands. Attention/concentration intact. Fund of knowledge appropriate.  -Cranial nerves:   II: Visual fields are full to confrontation.  III, IV, VI: Extraocular movements are intact without nystagmus. Pupils equally round and reactive to light  V:  Facial sensation V1-V3 equal and intact   VII: Face is symmetric with normal eye closure and smile  VIII: Hearing is bilaterally intact to finger rub  IX, X: Uvula is midline and soft palate rises symmetrically  XI: Head turning and shoulder shrug are intact.  XII: Tongue protrudes midline  Motor: Normal bulk and tone. No pronator drift. Strength bilateral upper extremity 5/5, bilateral lower extremities 5/5.  Rapid alternating movements intact and symmetric  Sensation: Intact to light touch bilaterally. No neglect or extinction on double simultaneous testing.  Coordination: No dysmetria on finger-to-nose and heel-to-shin bilaterally  Reflexes: Downgoing toes bilaterally   Gait: Narrow gait and steady    NIHSS: **** ASPECT Score: ***** ICH Score: ****** (GCS)    Fingerstick Blood Glucose: CAPILLARY BLOOD GLUCOSE      POCT Blood Glucose.: 138 mg/dL (06 Aug 2024 11:20)    LABS:                        14.7   5.92  )-----------( 206      ( 06 Aug 2024 11:33 )             43.9     08-06    136  |  100  |  x   ----------------------------<  x   5.1   |  x   |  x         PT/INR - ( 06 Aug 2024 11:33 )   PT: 11.5 sec;   INR: 1.01          PTT - ( 06 Aug 2024 11:33 )  PTT:33.4 sec          RADIOLOGY & ADDITIONAL STUDIES:      -----------------------------------------------------------------------------------------------------------------  IV-tenecteplase (Y/N):    ***                              Bolus time:    Tenecteplase Dose Verification w/ RN:  Reason IV-tenecteplase not given: ***    **STROKE CODE CONSULT NOTE**    Last known well time: 1000     HPI: 69y Male with PMHx of epilepsy on AED therapy follows Dr. Hawthorne, essential tremors s/p deep brain stimulator follows Dr. Scott, s/p defibrillator presents to [facility] ER for 1 hour of confusion now returning to baseline. Wife at bedside states pt woke up in his USOH, around 1000 pt was trying to charge his deep brain stimulator but it needs to be charged tomorrow, pt was also confused on how to charge it which is unusual for him as he is proficient in doing this independently. There was ?slowness in his speech during this point, was telling his daughter on the phone that he needs to go to the hospital because he felt off. Per wife, when EMS was testing year he answered incorrectly at home and in ambulance. BP on arrival to [facility] 170/125, Stroke code called. Wife states pt is at baseline now, pt agrees. On outpt review, pt was instructed to be on zonisamide 100mg QHS, however, only takes 50mg QHS as the increased dose makes him dizzy and drowsy. Denies weakness/numbness, headache, visual disturbances, speech disturbances.     T(C): 36.8 (08-06-24 @ 11:28), Max: 36.8 (08-06-24 @ 11:28)  HR: 58 (08-06-24 @ 11:28) (58 - 58)  BP: 170/125 (08-06-24 @ 11:28) (170/125 - 170/125)  RR: 18 (08-06-24 @ 11:28) (18 - 18)  SpO2: 99% (08-06-24 @ 11:28) (99% - 99%)    PAST MEDICAL & SURGICAL HISTORY:  Epilepsy      Essential tremor      S/P deep brain stimulator placement      FAMILY HISTORY:    SOCIAL HISTORY:   Smoking status:  Drinking:  Drug Use:     ROS:   Constitutional: No fever, weight loss or fatigue  Eyes: No eye pain, visual disturbances, or discharge  ENMT:  No difficulty hearing, tinnitus; No sinus or throat pain  Neck: No pain or stiffness  Respiratory: No cough, wheezing, chills or hemoptysis  Cardiovascular: No chest pain, palpitations, shortness of breath, or leg swelling  Gastrointestinal: No abdominal pain. No nausea, vomiting  Genitourinary: No dysuria, frequency, hematuria or incontinence  Neurological: As per HPI    MEDICATIONS  (STANDING):    MEDICATIONS  (PRN):    Allergies    No Known Allergies    Intolerances      Vital Signs Last 24 Hrs  T(C): 36.8 (06 Aug 2024 11:28), Max: 36.8 (06 Aug 2024 11:28)  T(F): 98.2 (06 Aug 2024 11:28), Max: 98.2 (06 Aug 2024 11:28)  HR: 58 (06 Aug 2024 11:28) (58 - 58)  BP: 170/125 (06 Aug 2024 11:28) (170/125 - 170/125)  BP(mean): --  RR: 18 (06 Aug 2024 11:28) (18 - 18)  SpO2: 99% (06 Aug 2024 11:28) (99% - 99%)    Parameters below as of 06 Aug 2024 11:28  Patient On (Oxygen Delivery Method): room air    Physical exam:  Constitutional: No acute distress, conversant  Eyes: Anicteric sclerae, moist conjunctivae, see below for CNs  Neck: trachea midline  Cardiovascular: Regular rate and rhythm  Pulmonary: No use of accessory muscles    Neurologic:  -Mental status: Awake, alert, oriented to person, place, and year (not to month). Non-fluent speech at baseline, with intact naming, repetition, and comprehension, no dysarthria. Follows commands. Attention/concentration intact. Fund of knowledge appropriate.  -Cranial nerves:   II: Visual fields are full to confrontation.  III, IV, VI: Extraocular movements are intact without nystagmus. Pupils equally round and reactive to light  V:  Facial sensation V1-V3 equal and intact   VII: Face is symmetric with normal eye closure and smile  VIII: Hearing is bilaterally intact to finger rub  IX, X: Uvula is midline and soft palate rises symmetrically  XI: Head turning and shoulder shrug are intact.  XII: Tongue protrudes midline  Motor: Normal bulk and tone. No pronator drift. Strength bilateral upper extremity 5/5, more pronounced essential tremor on LUE than RUE. bilateral lower extremities 5/5.  Sensation: Intact to light touch bilaterally. No neglect or extinction on double simultaneous testing.  Coordination: No dysmetria on finger-to-nose and heel-to-shin bilaterally. Essential tremor noted LUE.   Reflexes: Downgoing toes bilaterally   Gait: Narrow gait, baseline unsteady gait but not ataxic     NIHSS: 1    Fingerstick Blood Glucose: CAPILLARY BLOOD GLUCOSE      POCT Blood Glucose.: 138 mg/dL (06 Aug 2024 11:20)    LABS:                        14.7   5.92  )-----------( 206      ( 06 Aug 2024 11:33 )             43.9     08-06    136  |  100  |  x   ----------------------------<  x   5.1   |  x   |  x         PT/INR - ( 06 Aug 2024 11:33 )   PT: 11.5 sec;   INR: 1.01          PTT - ( 06 Aug 2024 11:33 )  PTT:33.4 sec      RADIOLOGY & ADDITIONAL STUDIES:    < from: CT Brain Stroke Protocol (08.06.24 @ 11:46) >  Impression:    CT HEAD: No acute intracranial injury.  Krupa Foley was informed of the results at 11:58 AM on 6 8/6/2024. This   study was performed on 8/6/2024 at 11:44 a.    CT perfusion:  Limited evaluation due to motion artifact.  Tmax greater than 6 seconds: 13 mL. Mismatch volume:13 mL. Mismatch   ratio: Infinite    CTA of the intracranial circulation: Limited evaluation. No evidence of   stenosis or aneurysm or aneurysm    CTA of the extracranial circulation. Limited evaluation. No evidence of   carotid stenosis.    < end of copied text >      -----------------------------------------------------------------------------------------------------------------  IV-tenecteplase (Y/N):  N                         Bolus time:    Tenecteplase Dose Verification w/ RN:  Reason IV-tenecteplase not given: resolving symptoms

## 2024-08-06 NOTE — ED PROVIDER NOTE - OBJECTIVE STATEMENT
69y Male with PMHx of epilepsy on AED therapy follows Dr. Hawthorne, essential tremors s/p deep brain stimulator follows Dr. Scott, s/p defibrillator presents to [facility] ER for 1 hour of confusion now returning to baseline. Wife at bedside states pt woke up in his USOH, around 1000 pt was trying to charge his deep brain stimulator but it needs to be charged tomorrow, pt was also confused on how to charge it which is unusual for him as he is proficient in doing this independently. There was ?slowness in his speech during this point, was telling his daughter on the phone that he needs to go to the hospital because he felt off. Per wife, when EMS was testing year he answered incorrectly at home and in ambulance.  Pt improving upon arrival.  Stroke code activation at triage.

## 2024-08-06 NOTE — STROKE CODE NOTE - NIH STROKE SCALE: 6A. MOTOR LEG, LEFT, QM
[de-identified] : 36 yo F with no significant PMH presents for annual. She has been well, had antibody testing in May, negative.\par \par Has been more active, diet is healthy.\par \par UTD pap smear. Declines STD testing. UTD vaccines. (0) No drift; leg holds 30 degree position for full 5 secs

## 2024-08-06 NOTE — CONSULT NOTE ADULT - SUBJECTIVE AND OBJECTIVE BOX
EPILEPSY CONSULT NOTE    69y Male with PMHx of epilepsy on AED therapy follows Dr. Hawthorne, essential tremors s/p deep brain stimulator follows Dr. Scott, s/p defibrillator presents to [facility] ER for 1 hour of confusion now returned to baseline. Wife at bedside states pt woke up in his [facility], around 1000 pt was trying to charge his deep brain stimulator but it needs to be charged tomorrow, pt was also confused on how to charge it which is unusual for him as he is proficient in doing this independently. There was ?slowness in his speech during this point, was telling his daughter on the phone that he needs to go to the hospital because he felt off. Per wife, when EMS was testing year he answered incorrectly at home and in ambulance. BP on arrival to [facility] 170/125, Stroke code called. Stroke workup negative.  Wife states pt is at baseline now, pt agrees. On outpt review, pt was instructed to be on zonisamide 100mg QHS, however, only takes 50mg QHS as the increased dose makes him dizzy and drowsy (has taken it at this reduced dose for >1 month).      His typical seizures are tonic-clonic lasting 30 sec-1 min and resolve spontaneously but with post-ictal confusion for >1h.  He has had one of these seizures on Saturday.  He also has events in which he "passes out/doses off."  He is on Briviact 125mg BID and Zonisamide 100mg qHS but has only been taking Zonisamide 50mg qHS.     Most recent outpatient note from Dr. Hawthorne on :    Wife reports 3 small seizures, described as staring since last visit. Most recently last week  Did not increase ZNS as discussed last visit  Current AEDs: ZNS 50mg/night, Briviact 125mg BID and GBP 600mg TID (for tremors per patient)  EEG -2023- Occasional to frequent sleep activated right temporal sharp waves  AT THAT TIME:  Again instructed to increase ZNS to 100mg/night.    Denies weakness/numbness, headache, visual disturbances, speech disturbances.       VITAL SIGNS:  Vital Signs Last 24 Hrs  T(C): 36.8 (06 Aug 2024 11:28), Max: 36.8 (06 Aug 2024 11:28)  T(F): 98.2 (06 Aug 2024 11:28), Max: 98.2 (06 Aug 2024 11:28)  HR: 64 (06 Aug 2024 14:00) (58 - 86)  BP: 186/82 (06 Aug 2024 14:00) (146/86 - 208/120)  BP(mean): 137 (06 Aug 2024 13:00) (110 - 137)  RR: 18 (06 Aug 2024 14:00) (18 - 18)  SpO2: 98% (06 Aug 2024 14:00) (97% - 99%)    Parameters below as of 06 Aug 2024 14:00  Patient On (Oxygen Delivery Method): room air      I&O's Summary      PHYSICAL EXAM:  -Mental status: Awake, alert, oriented to person, place, and year (not to month). Non-fluent speech at baseline, with intact naming, repetition, and comprehension, no dysarthria. Follows commands. Attention/concentration intact. Fund of knowledge appropriate.  -Cranial nerves:   II: Visual fields are full to confrontation.  III, IV, VI: Extraocular movements are intact without nystagmus. Pupils equally round and reactive to light  V:  Facial sensation V1-V3 equal and intact   VII: Face is symmetric with normal eye closure and smile  VIII: Hearing is bilaterally intact to finger rub  IX, X: Uvula is midline and soft palate rises symmetrically  XI: Head turning and shoulder shrug are intact.  XII: Tongue protrudes midline  Motor: Normal bulk and tone. No pronator drift. Strength bilateral upper extremity 5/5, more pronounced essential tremor on LUE than RUE. bilateral lower extremities 5/5.  Sensation: Intact to light touch bilaterally. No neglect or extinction on double simultaneous testing.  Coordination: No dysmetria on finger-to-nose and heel-to-shin bilaterally. Essential tremor noted LUE.   Reflexes: Downgoing toes bilaterally   Gait: Narrow gait, baseline unsteady gait but not ataxic     MEDICATIONS:  MEDICATIONS  (STANDING):    MEDICATIONS  (PRN):      ALLERGIES:  Allergies    No Known Allergies    Intolerances        LABS:                        14.7   5.92  )-----------( 206      ( 06 Aug 2024 11:33 )             43.9     08-06    136  |  100  |  10  ----------------------------<  125<H>  5.1   |  25  |  0.82    Ca    9.6      06 Aug 2024 11:33    TPro  8.4<H>  /  Alb  4.9  /  TBili  0.6  /  DBili  x   /  AST  32  /  ALT  14  /  AlkPhos  82  08-06    PT/INR - ( 06 Aug 2024 11:33 )   PT: 11.5 sec;   INR: 1.01          PTT - ( 06 Aug 2024 11:33 )  PTT:33.4 sec  Urinalysis Basic - ( 06 Aug 2024 11:33 )    Color: Yellow / Appearance: Clear / S.028 / pH: x  Gluc: 125 mg/dL / Ketone: Negative mg/dL  / Bili: Negative / Urobili: 0.2 mg/dL   Blood: x / Protein: Negative mg/dL / Nitrite: Negative   Leuk Esterase: Negative / RBC: x / WBC x   Sq Epi: x / Non Sq Epi: x / Bacteria: x      CAPILLARY BLOOD GLUCOSE      POCT Blood Glucose.: 138 mg/dL (06 Aug 2024 11:20)      RADIOLOGY & ADDITIONAL TESTS: Reviewed.   EPILEPSY CONSULT NOTE    69y Male with PMHx of epilepsy on AED therapy follows Dr. Hawthorne, essential tremors s/p deep brain stimulator follows Dr. Scott, s/p defibrillator presents to [facility] ER for 1 hour of confusion now returned to baseline.  He had an episode of transient expressive aphasia while in ED which per wife happened on Saturday as well. Wife at bedside states pt woke up in his [facility], around 1000 pt was trying to charge his deep brain stimulator but it needs to be charged tomorrow, pt was also confused on how to charge it which is unusual for him as he is proficient in doing this independently. There was ?slowness in his speech during this point, was telling his daughter on the phone that he needs to go to the hospital because he felt off. Per wife, when EMS was testing year he answered incorrectly at home and in ambulance. BP on arrival to [facility] 170/125, Stroke code called. Stroke workup negative.  Wife states pt is at baseline now, pt agrees. On outpt review, pt was instructed to be on zonisamide 100mg QHS, however, only takes 50mg QHS as the increased dose makes him dizzy and drowsy (has taken it at this reduced dose for >1 month).      His typical seizures are tonic-clonic lasting 30 sec-1 min and resolve spontaneously but with post-ictal confusion for >1h.  He has had one of these seizures on Saturday.  He also has events in which he "passes out/doses off."  He is on Briviact 125mg BID and Zonisamide 100mg qHS but has only been taking Zonisamide 50mg qHS.     Most recent outpatient note from Dr. Hawthorne on :    Wife reports 3 small seizures, described as staring since last visit. Most recently last week  Did not increase ZNS as discussed last visit  Current AEDs: ZNS 50mg/night, Briviact 125mg BID and GBP 600mg TID (for tremors per patient)  EEG -2023- Occasional to frequent sleep activated right temporal sharp waves  AT THAT TIME:  Again instructed to increase ZNS to 100mg/night.    Denies weakness/numbness, headache, visual disturbances, speech disturbances.       VITAL SIGNS:  Vital Signs Last 24 Hrs  T(C): 36.8 (06 Aug 2024 11:28), Max: 36.8 (06 Aug 2024 11:28)  T(F): 98.2 (06 Aug 2024 11:28), Max: 98.2 (06 Aug 2024 11:28)  HR: 64 (06 Aug 2024 14:00) (58 - 86)  BP: 186/82 (06 Aug 2024 14:00) (146/86 - 208/120)  BP(mean): 137 (06 Aug 2024 13:00) (110 - 137)  RR: 18 (06 Aug 2024 14:00) (18 - 18)  SpO2: 98% (06 Aug 2024 14:00) (97% - 99%)    Parameters below as of 06 Aug 2024 14:00  Patient On (Oxygen Delivery Method): room air      I&O's Summary      PHYSICAL EXAM:  -Mental status: Awake, alert, oriented to person, place, and year (not to month). Non-fluent speech at baseline, with intact naming, repetition, and comprehension, no dysarthria. Follows commands. Attention/concentration intact. Fund of knowledge appropriate.  -Cranial nerves:   II: Visual fields are full to confrontation.  III, IV, VI: Extraocular movements are intact without nystagmus. Pupils equally round and reactive to light  V:  Facial sensation V1-V3 equal and intact   VII: Face is symmetric with normal eye closure and smile  VIII: Hearing is bilaterally intact to finger rub  IX, X: Uvula is midline and soft palate rises symmetrically  XI: Head turning and shoulder shrug are intact.  XII: Tongue protrudes midline  Motor: Normal bulk and tone. No pronator drift. Strength bilateral upper extremity 5/5, more pronounced essential tremor on LUE than RUE. bilateral lower extremities 5/5.  Sensation: Intact to light touch bilaterally. No neglect or extinction on double simultaneous testing.  Coordination: No dysmetria on finger-to-nose and heel-to-shin bilaterally. Essential tremor noted LUE.   Reflexes: Downgoing toes bilaterally   Gait: Narrow gait, baseline unsteady gait but not ataxic     MEDICATIONS:  MEDICATIONS  (STANDING):    MEDICATIONS  (PRN):      ALLERGIES:  Allergies    No Known Allergies    Intolerances        LABS:                        14.7   5.92  )-----------( 206      ( 06 Aug 2024 11:33 )             43.9     08-06    136  |  100  |  10  ----------------------------<  125<H>  5.1   |  25  |  0.82    Ca    9.6      06 Aug 2024 11:33    TPro  8.4<H>  /  Alb  4.9  /  TBili  0.6  /  DBili  x   /  AST  32  /  ALT  14  /  AlkPhos  82  08-06    PT/INR - ( 06 Aug 2024 11:33 )   PT: 11.5 sec;   INR: 1.01          PTT - ( 06 Aug 2024 11:33 )  PTT:33.4 sec  Urinalysis Basic - ( 06 Aug 2024 11:33 )    Color: Yellow / Appearance: Clear / S.028 / pH: x  Gluc: 125 mg/dL / Ketone: Negative mg/dL  / Bili: Negative / Urobili: 0.2 mg/dL   Blood: x / Protein: Negative mg/dL / Nitrite: Negative   Leuk Esterase: Negative / RBC: x / WBC x   Sq Epi: x / Non Sq Epi: x / Bacteria: x      CAPILLARY BLOOD GLUCOSE      POCT Blood Glucose.: 138 mg/dL (06 Aug 2024 11:20)      RADIOLOGY & ADDITIONAL TESTS: Reviewed.

## 2024-08-06 NOTE — H&P ADULT - NSHPLABSRESULTS_GEN_ALL_CORE
Vital Signs Last 24 Hrs  T(C): 36.9 (06 Aug 2024 18:10), Max: 37.2 (06 Aug 2024 15:00)  T(F): 98.4 (06 Aug 2024 18:10), Max: 99 (06 Aug 2024 15:00)  HR: 54 (06 Aug 2024 18:49) (54 - 86)  BP: 155/97 (06 Aug 2024 18:49) (146/86 - 208/120)  BP(mean): 113 (06 Aug 2024 18:49) (109 - 137)  RR: 18 (06 Aug 2024 18:49) (16 - 18)  SpO2: 99% (06 Aug 2024 18:49) (97% - 99%)    Fingerstick Blood Glucose: CAPILLARY BLOOD GLUCOSE      POCT Blood Glucose.: 138 mg/dL (06 Aug 2024 11:20)    LABS:                        14.7   5.92  )-----------( 206      ( 06 Aug 2024 11:33 )             43.9     08-06    136  |  100  |  10  ----------------------------<  125<H>  5.1   |  25  |  0.82    Ca    9.6      06 Aug 2024 11:33    TPro  8.4<H>  /  Alb  4.9  /  TBili  0.6  /  DBili  x   /  AST  32  /  ALT  14  /  AlkPhos  82  08-06    PT/INR - ( 06 Aug 2024 11:33 )   PT: 11.5 sec;   INR: 1.01          PTT - ( 06 Aug 2024 11:33 )  PTT:33.4 sec      Urinalysis Basic - ( 06 Aug 2024 11:33 )    Color: Yellow / Appearance: Clear / S.028 / pH: x  Gluc: 125 mg/dL / Ketone: Negative mg/dL  / Bili: Negative / Urobili: 0.2 mg/dL   Blood: x / Protein: Negative mg/dL / Nitrite: Negative   Leuk Esterase: Negative / RBC: x / WBC x   Sq Epi: x / Non Sq Epi: x / Bacteria: x    RADIOLOGY & ADDITIONAL STUDIES:    HCT:     CTA:

## 2024-08-06 NOTE — ED ADULT NURSE NOTE - EXTENSIONS OF SELF_ADULT
Continue: Refresh Celluvisc (carboxymethylcellulose sodium): dropperette,gel: 1% 1 drop at bedtime into both eyes 02- None

## 2024-08-06 NOTE — H&P ADULT - HISTORY OF PRESENT ILLNESS
**STROKE HPI***    HPI: 69y Male with PMHx of epilepsy on AED therapy follows Dr. Hawthorne, essential tremors s/p deep brain stimulator follows Dr. Scott, s/p defibrillator presents to [facility] ER for 1 hour of confusion now returning to baseline. Wife at bedside states pt woke up in his [facility], around 1000 pt was trying to charge his deep brain stimulator but it needs to be charged tomorrow, pt was also confused on how to charge it which is unusual for him as he is proficient in doing this independently. There was ?slowness in his speech during this point, was telling his daughter on the phone that he needs to go to the hospital because he felt off. Per wife, when EMS was testing year he answered incorrectly at home and in ambulance. BP on arrival to [facility] 170/125, Stroke code called. NIHSS 1. CTH negative for acute intracranial pathology, CTP with tmax >6 secs in right hemisphere 13mL but likely due to motion artifact, CTA H/N negative for stenosis or occlusion. Pt with transient episode of expressive aphasia in ED ~ 2 minutes and spontaneously resolved, wife noted pt had a similar episode 3 days ago lasting 15-30 seconds and resolved. Pt has never had his seizures present as expressive aphasia. Epilepsy consulted in ED, recommended to take his zonisamide as prescribed otherwise signed off.

## 2024-08-07 ENCOUNTER — RESULT REVIEW (OUTPATIENT)
Age: 69
End: 2024-08-07

## 2024-08-07 ENCOUNTER — TRANSCRIPTION ENCOUNTER (OUTPATIENT)
Age: 69
End: 2024-08-07

## 2024-08-07 VITALS — TEMPERATURE: 99 F

## 2024-08-07 LAB
A1C WITH ESTIMATED AVERAGE GLUCOSE RESULT: 5.4 % — SIGNIFICANT CHANGE UP (ref 4–5.6)
ANION GAP SERPL CALC-SCNC: 13 MMOL/L — SIGNIFICANT CHANGE UP (ref 5–17)
BUN SERPL-MCNC: 11 MG/DL — SIGNIFICANT CHANGE UP (ref 7–23)
CALCIUM SERPL-MCNC: 9.4 MG/DL — SIGNIFICANT CHANGE UP (ref 8.4–10.5)
CHLORIDE SERPL-SCNC: 100 MMOL/L — SIGNIFICANT CHANGE UP (ref 96–108)
CHOLEST SERPL-MCNC: 158 MG/DL — SIGNIFICANT CHANGE UP
CO2 SERPL-SCNC: 23 MMOL/L — SIGNIFICANT CHANGE UP (ref 22–31)
CREAT SERPL-MCNC: 0.78 MG/DL — SIGNIFICANT CHANGE UP (ref 0.5–1.3)
EGFR: 96 ML/MIN/1.73M2 — SIGNIFICANT CHANGE UP
ESTIMATED AVERAGE GLUCOSE: 108 MG/DL — SIGNIFICANT CHANGE UP (ref 68–114)
GLUCOSE SERPL-MCNC: 111 MG/DL — HIGH (ref 70–99)
HCT VFR BLD CALC: 42.2 % — SIGNIFICANT CHANGE UP (ref 39–50)
HDLC SERPL-MCNC: 67 MG/DL — SIGNIFICANT CHANGE UP
HGB BLD-MCNC: 14.3 G/DL — SIGNIFICANT CHANGE UP (ref 13–17)
LIPID PNL WITH DIRECT LDL SERPL: 79 MG/DL — SIGNIFICANT CHANGE UP
MAGNESIUM SERPL-MCNC: 2.1 MG/DL — SIGNIFICANT CHANGE UP (ref 1.6–2.6)
MCHC RBC-ENTMCNC: 31.2 PG — SIGNIFICANT CHANGE UP (ref 27–34)
MCHC RBC-ENTMCNC: 33.9 GM/DL — SIGNIFICANT CHANGE UP (ref 32–36)
MCV RBC AUTO: 92.1 FL — SIGNIFICANT CHANGE UP (ref 80–100)
NON HDL CHOLESTEROL: 91 MG/DL — SIGNIFICANT CHANGE UP
NRBC # BLD: 0 /100 WBCS — SIGNIFICANT CHANGE UP (ref 0–0)
PHOSPHATE SERPL-MCNC: 2.5 MG/DL — SIGNIFICANT CHANGE UP (ref 2.5–4.5)
PLATELET # BLD AUTO: 197 K/UL — SIGNIFICANT CHANGE UP (ref 150–400)
POTASSIUM SERPL-MCNC: 3.8 MMOL/L — SIGNIFICANT CHANGE UP (ref 3.5–5.3)
POTASSIUM SERPL-SCNC: 3.8 MMOL/L — SIGNIFICANT CHANGE UP (ref 3.5–5.3)
RBC # BLD: 4.58 M/UL — SIGNIFICANT CHANGE UP (ref 4.2–5.8)
RBC # FLD: 13.2 % — SIGNIFICANT CHANGE UP (ref 10.3–14.5)
SODIUM SERPL-SCNC: 136 MMOL/L — SIGNIFICANT CHANGE UP (ref 135–145)
TRIGL SERPL-MCNC: 57 MG/DL — SIGNIFICANT CHANGE UP
TSH SERPL-MCNC: 1.17 UIU/ML — SIGNIFICANT CHANGE UP (ref 0.27–4.2)
WBC # BLD: 6.37 K/UL — SIGNIFICANT CHANGE UP (ref 3.8–10.5)
WBC # FLD AUTO: 6.37 K/UL — SIGNIFICANT CHANGE UP (ref 3.8–10.5)

## 2024-08-07 PROCEDURE — 71045 X-RAY EXAM CHEST 1 VIEW: CPT

## 2024-08-07 PROCEDURE — 80307 DRUG TEST PRSMV CHEM ANLYZR: CPT

## 2024-08-07 PROCEDURE — 85610 PROTHROMBIN TIME: CPT

## 2024-08-07 PROCEDURE — 84443 ASSAY THYROID STIM HORMONE: CPT

## 2024-08-07 PROCEDURE — 85025 COMPLETE CBC W/AUTO DIFF WBC: CPT

## 2024-08-07 PROCEDURE — 95700 EEG CONT REC W/VID EEG TECH: CPT

## 2024-08-07 PROCEDURE — 84100 ASSAY OF PHOSPHORUS: CPT

## 2024-08-07 PROCEDURE — 80053 COMPREHEN METABOLIC PANEL: CPT

## 2024-08-07 PROCEDURE — 84484 ASSAY OF TROPONIN QUANT: CPT

## 2024-08-07 PROCEDURE — 70450 CT HEAD/BRAIN W/O DYE: CPT | Mod: MC

## 2024-08-07 PROCEDURE — 99222 1ST HOSP IP/OBS MODERATE 55: CPT

## 2024-08-07 PROCEDURE — 70496 CT ANGIOGRAPHY HEAD: CPT | Mod: MC

## 2024-08-07 PROCEDURE — 97161 PT EVAL LOW COMPLEX 20 MIN: CPT

## 2024-08-07 PROCEDURE — 93306 TTE W/DOPPLER COMPLETE: CPT | Mod: 26

## 2024-08-07 PROCEDURE — 97165 OT EVAL LOW COMPLEX 30 MIN: CPT

## 2024-08-07 PROCEDURE — 81003 URINALYSIS AUTO W/O SCOPE: CPT

## 2024-08-07 PROCEDURE — 99285 EMERGENCY DEPT VISIT HI MDM: CPT

## 2024-08-07 PROCEDURE — 85730 THROMBOPLASTIN TIME PARTIAL: CPT

## 2024-08-07 PROCEDURE — 85027 COMPLETE CBC AUTOMATED: CPT

## 2024-08-07 PROCEDURE — 83036 HEMOGLOBIN GLYCOSYLATED A1C: CPT

## 2024-08-07 PROCEDURE — 0042T: CPT | Mod: MC

## 2024-08-07 PROCEDURE — 95720 EEG PHY/QHP EA INCR W/VEEG: CPT

## 2024-08-07 PROCEDURE — 95708 EEG WO VID EA 12-26HR UNMNTR: CPT

## 2024-08-07 PROCEDURE — 93306 TTE W/DOPPLER COMPLETE: CPT

## 2024-08-07 PROCEDURE — 82962 GLUCOSE BLOOD TEST: CPT

## 2024-08-07 PROCEDURE — 70498 CT ANGIOGRAPHY NECK: CPT | Mod: MC

## 2024-08-07 PROCEDURE — 80299 QUANTITATIVE ASSAY DRUG: CPT

## 2024-08-07 PROCEDURE — 80061 LIPID PANEL: CPT

## 2024-08-07 PROCEDURE — 93005 ELECTROCARDIOGRAM TRACING: CPT

## 2024-08-07 PROCEDURE — 80048 BASIC METABOLIC PNL TOTAL CA: CPT

## 2024-08-07 PROCEDURE — 36415 COLL VENOUS BLD VENIPUNCTURE: CPT

## 2024-08-07 PROCEDURE — 83735 ASSAY OF MAGNESIUM: CPT

## 2024-08-07 PROCEDURE — 80203 DRUG SCREEN QUANT ZONISAMIDE: CPT

## 2024-08-07 RX ORDER — ZONISAMIDE 100 MG/1
1 CAPSULE ORAL
Qty: 0 | Refills: 0 | DISCHARGE

## 2024-08-07 RX ORDER — POTASSIUM CHLORIDE 1500 MG/1
20 TABLET, EXTENDED RELEASE ORAL ONCE
Refills: 0 | Status: COMPLETED | OUTPATIENT
Start: 2024-08-07 | End: 2024-08-07

## 2024-08-07 RX ADMIN — BRIVARACETAM 25 MILLIGRAM(S): 100 TABLET, FILM COATED ORAL at 12:44

## 2024-08-07 RX ADMIN — ZONISAMIDE 50 MILLIGRAM(S): 100 CAPSULE ORAL at 10:55

## 2024-08-07 RX ADMIN — GABAPENTIN 600 MILLIGRAM(S): 400 CAPSULE ORAL at 06:57

## 2024-08-07 RX ADMIN — POTASSIUM CHLORIDE 20 MILLIEQUIVALENT(S): 1500 TABLET, EXTENDED RELEASE ORAL at 10:55

## 2024-08-07 RX ADMIN — AMLODIPINE BESYLATE 5 MILLIGRAM(S): 2.5 TABLET ORAL at 06:57

## 2024-08-07 RX ADMIN — BRIVARACETAM 100 MILLIGRAM(S): 100 TABLET, FILM COATED ORAL at 12:44

## 2024-08-07 NOTE — OCCUPATIONAL THERAPY INITIAL EVALUATION ADULT - PERTINENT HX OF CURRENT PROBLEM, REHAB EVAL
69y Male with PMHx of epilepsy on AED therapy follows Dr. Hawthorne, essential tremors s/p deep brain stimulator follows Dr. Scott, s/p defibrillator presents to [facility] ER for 1 hour of confusion now returning to baseline. Wife at bedside states pt woke up in his [facility], around 1000 pt was trying to charge his deep brain stimulator but it needs to be charged tomorrow, pt was also confused on how to charge it which is unusual for him as he is proficient in doing this independently. There was ?slowness in his speech during this point, was telling his daughter on the phone that he needs to go to the hospital because he felt off. Per wife, when EMS was testing year he answered incorrectly at home and in ambulance. BP on arrival to [facility] 170/125, Stroke code called. NIHSS 1. CTH negative for acute intracranial pathology, CTP with tmax >6 secs in right hemisphere 13mL but likely due to motion artifact, CTA H/N negative for stenosis or occlusion. Pt with transient episode of expressive aphasia in ED ~ 2 minutes and spontaneously resolved, wife noted pt had a similar episode 3 days ago lasting 15-30 seconds and resolved. Pt has never had his seizures present as expressive aphasia. Epilepsy consulted in ED, recommended to take his zonisamide as prescribed otherwise signed off.

## 2024-08-07 NOTE — PHYSICAL THERAPY INITIAL EVALUATION ADULT - GENERAL OBSERVATIONS, REHAB EVAL
PT IE completed. MRS: 1. Patient received semi supine in bed +tele, +heplock IV, +vEEG, +wife present at bedside, patient NAD, willing to work with PT.

## 2024-08-07 NOTE — CONSULT NOTE ADULT - SUBJECTIVE AND OBJECTIVE BOX
Patient is a 69y old  Male who presents with a chief complaint of     HPI:   **STROKE HPI***    HPI: 69y Male with PMHx of epilepsy on AED therapy follows Dr. Hawthorne, essential tremors s/p deep brain stimulator follows Dr. Scott, s/p defibrillator presents to [facility] ER for 1 hour of confusion now returning to baseline. Wife at bedside states pt woke up in his USOH, around 1000 pt was trying to charge his deep brain stimulator but it needs to be charged tomorrow, pt was also confused on how to charge it which is unusual for him as he is proficient in doing this independently. There was ?slowness in his speech during this point, was telling his daughter on the phone that he needs to go to the hospital because he felt off. Per wife, when EMS was testing year he answered incorrectly at home and in ambulance. BP on arrival to [facility] 170/125, Stroke code called. NIHSS 1. CTH negative for acute intracranial pathology, CTP with tmax >6 secs in right hemisphere 13mL but likely due to motion artifact, CTA H/N negative for stenosis or occlusion. Pt with transient episode of expressive aphasia in ED ~ 2 minutes and spontaneously resolved, wife noted pt had a similar episode 3 days ago lasting 15-30 seconds and resolved. Pt has never had his seizures present as expressive aphasia. Epilepsy consulted in ED, recommended to take his zonisamide as prescribed otherwise signed off.  (06 Aug 2024 19:18)    PAST MEDICAL & SURGICAL HISTORY:  Epilepsy      Essential tremor      S/P deep brain stimulator placement        MEDICATIONS  (STANDING):  amLODIPine   Tablet 5 milliGRAM(s) Oral daily  aspirin enteric coated 81 milliGRAM(s) Oral daily  atorvastatin 80 milliGRAM(s) Oral at bedtime  brivaracetam 25 milliGRAM(s) Oral every 12 hours  brivaracetam 100 milliGRAM(s) Oral every 12 hours  enoxaparin Injectable 40 milliGRAM(s) SubCutaneous every 24 hours  gabapentin 600 milliGRAM(s) Oral every 8 hours  zonisamide 50 milliGRAM(s) Oral every 12 hours    MEDICATIONS  (PRN):        FAMILY HISTORY:      CBC Full  -  ( 06 Aug 2024 11:33 )  WBC Count : 5.92 K/uL  RBC Count : 4.66 M/uL  Hemoglobin : 14.7 g/dL  Hematocrit : 43.9 %  Platelet Count - Automated : 206 K/uL  Mean Cell Volume : 94.2 fl  Mean Cell Hemoglobin : 31.5 pg  Mean Cell Hemoglobin Concentration : 33.5 gm/dL  Auto Neutrophil # : 3.47 K/uL  Auto Lymphocyte # : 1.87 K/uL  Auto Monocyte # : 0.51 K/uL  Auto Eosinophil # : 0.02 K/uL  Auto Basophil # : 0.04 K/uL  Auto Neutrophil % : 58.6 %  Auto Lymphocyte % : 31.6 %  Auto Monocyte % : 8.6 %  Auto Eosinophil % : 0.3 %  Auto Basophil % : 0.7 %          136  |  100  |  10  ----------------------------<  125<H>  5.1   |  25  |  0.82    Ca    9.6      06 Aug 2024 11:33    TPro  8.4<H>  /  Alb  4.9  /  TBili  0.6  /  DBili  x   /  AST  32  /  ALT  14  /  AlkPhos  82        Urinalysis Basic - ( 06 Aug 2024 11:33 )    Color: Yellow / Appearance: Clear / S.028 / pH: x  Gluc: 125 mg/dL / Ketone: Negative mg/dL  / Bili: Negative / Urobili: 0.2 mg/dL   Blood: x / Protein: Negative mg/dL / Nitrite: Negative   Leuk Esterase: Negative / RBC: x / WBC x   Sq Epi: x / Non Sq Epi: x / Bacteria: x        Radiology :     < from: CT Brain Perfusion Maps Stroke (24 @ 12:01) >  ACC: [id number] EXAM:  CT BRAIN PERFUSION MAPS STROKE   ORDERED BY: EUGENE GALLARDO     ACC: [id number] EXAM:  CT ANGIO NECK STROKE PROTCL IC   ORDERED BY: EUGENE GALLARDO     ACC: [id number] EXAM:  CT BRAIN STROKE PROTOCOL   ORDERED BY: EUGENE GALLARDO     ACC: [id number] EXAM:  CT ANGIO BRAIN STROKE PROTC IC   ORDERED BY: EUGENE GALLARDO     PROCEDURE DATE:  2024          INTERPRETATION:  CLINICAL INFORMATION: Stroke Code    COMPARISON: None.    CONTRAST: IV Contrast: Isovue 370(accession [id number]), IV contrast   documented in unlinked concurrent exam (accession [id number]), Isovue 370   (accession [id number])  80 cc administered (accession [id number]), 40 cc   administered (accession [id number])  20 cc discarded (accession [id number]),   60 cc discarded (accession [id number])  Complications: None reported at time of study completion    Findings:    There is no evidence of acute intracranial hemorrhage or acute   transcortical infarct. There is a left-sided deep brain stimulator with   its wire extending to the level of the posterior thalamus. There is   enlargement of the sulci, cisterns and ventricles consistent with   cerebellar atrophy. There is no evidence of hydrocephalus. There is no   evidence of mass-effect or midline shift. There is no evidence of an   intra or extra-axial fluid collection.    Visualized paranasal sinuses and bilateral mastoid air cells are clear.      CT perfusion:    TECHNIQUE: Following the intravenous administration of 40  ml of Optiray   350,serial axial images were obtained through the brain. The CT   perfusion data set was post processed per [facility] protocol   generating color maps of CBF, CBV, MTT, and Tmax.    COMPARISON: None    FINDINGS: There is motion artifact that limits this evaluation.    CBF less than 30% volume: 0 mL.  Tmax greater than 6 seconds: 13 mL. Color map images changes within the   left inferior temporal lobe and lateral left posterior fossa Mismatch   volume: 13 mL.  Mismatch ratio: Infinite    IMPRESSION:  No evidence of CT perfusion deficit.    CT angiogram of the intracranial carotid arteries    Technique: CT angiogram of the intracranial carotid arteries was   performed was performed utilizing helical slices from the base of the   skull through the vertex during bolus injection of intravenous contrast   material. Overlapping reconstructions were obtained to allow for sagittal   and coronal reformatted images . 3-D images were created from the data   set. Images were also included.    Thereis motion artifact that limits this evaluation.    Findings: Evaluation of the anterior circulation demonstrates normal   course and caliber of the distal internal carotid arteries. There is a   normal appearance to the bilateral anterior cerebral and middle cerebral   arteries.    Evaluation the posterior circulation demonstrates normal course and   caliber of the bilateral vertebral arteries, vertebrobasilar junction and   basilar artery. The bilateral posterior cerebral arteries are also within   normal limits. There are tiny bilateral posterior communicating arteries   present.    There is no evidence of aneurysm or stenosis .      CT angiogram of the extracranial carotid arteries:    Technique: CT angiogram of the extracranial carotid arteries was   performed was performed utilizing helical slices from the base of the   skull through the mediastinum during bolus injection of intravenous   contrast material. Overlapping reconstructions were obtained to allow for   sagittal and coronalreformatted images. 3-D reconstruction was also   performed.    Findings: Evaluation of the aortic arch and the origin of the great   vessels are within normal limits. There are. Mild Calcifications along   the aorta and great vessels consistent withatherosclerotic disease    The course and caliber of the bilateral common carotid, internal carotid   and external carotid arteries are within normal limits. There is no   evidence of focal stenosis or dissection.    The origin of the vertebral arteries are within normal limits without   evidence of stenosis. The course and caliber of the cervical segments of   the vertebral arteries are normal.    Diffuse defibrillator/ICD is seen along the left chest wall.    Impression:    CT HEAD: No acute intracranial injury.  Krupa Foley was informed of the results at 11:58 AM on 2024. This   study was performed on 2024 at 11:44 a.    CT perfusion:  Limited evaluation due to motion artifact.  Tmax greater than 6 seconds: 13 mL. Mismatch volume:13 mL. Mismatch   ratio: Infinite    CTA of the intracranial circulation: Limited evaluation. No evidence of   stenosis or aneurysm or aneurysm    CTA of the extracranial circulation. Limited evaluation. No evidence of   carotid stenosis.            Review of Systems : per HPI         Vital Signs Last 24 Hrs  T(C): 37.4 (07 Aug 2024 05:00), Max: 37.4 (07 Aug 2024 05:00)  T(F): 99.3 (07 Aug 2024 05:00), Max: 99.3 (07 Aug 2024 05:00)  HR: 51 (07 Aug 2024 06:34) (50 - 86)  BP: 129/68 (07 Aug 2024 06:34) (128/68 - 208/120)  BP(mean): 93 (07 Aug 2024 06:34) (91 - 137)  RR: 16 (07 Aug 2024 06:34) (15 - 30)  SpO2: 99% (07 Aug 2024 06:34) (97% - 99%)    Parameters below as of 07 Aug 2024 06:34  Patient On (Oxygen Delivery Method): room air            Physical Exam:   69 y o man w/ VEEG wrap , lying comfortably in semi Nolan's position , awake , alert , no acute complaints     Head: normocephalic , VEEG wrap    Eyes: PERRLA , EOMI , no nystagmus , sclera anicteric    ENT / FACE: neg nasal discharge , uvula midline , no oropharyngeal erythema / exudate    Neck: supple , negative JVD , negative carotid bruits , no thyromegaly    Chest: CTA bilaterally     Cardiovascular: regular rate and rhythm , neg murmurs / rubs / gallops    Abdomen: soft , non distended , no tenderness to palpation in all 4 quadrants ,  normal bowel sounds     Extremities: WWP , neg cyanosis /clubbing / edema      Neurologic Exam:     Alert and oriented to person , place , date/year , speech fluent w/o dysarthria , follows commands , recent and remote memory intact , repetition intact , comprehension intact ,  attention/concentration intact , fund of knowledge appropriate    Cranial Nerves:           II:                         pupils equal , round and reactive to light , visual fields intact         III/ IV/VI:             extraocular movements intact , neg nystagmus , neg ptosis        V:                        facial sensation intact , V1-3 normal        VII:                      face symmetric , no droop , normal eye closure and smile        VIII:                     hearing intact to finger rub bilaterally        IX and X:             no hoarseness , gag intact , palate/ uvula rise symmetrically        XI:                       SCM / trapezius strength intact bilateral        XII:                      no tongue deviation    Motor Exam:        > 3+/5 x 4 extremities , without drift     Sensation:         intact to light touch x 4 extremities                            no neglect or extinction on double simultaneous testing    DTR:           biceps/brachioradialis: equal                            patella/ankle: equal          neg Babinski         Coordination:            Finger to Nose:  hand tremors       Gait:  not tested         PM&R Impression: admitted for transient episode of expressive aphasia     - no acute pathology on CT brain imaging , MRI pending if DBS is compatible        Recommendations / Plan:       1) Physical / Occupational therapy focusing on therapeutic exercises , equipment evaluation , bed mobility/transfer out of bed evaluation , progressive ambulation with assistive devices prn .    2) Current disposition plan recommendation:    pending functional progress

## 2024-08-07 NOTE — OCCUPATIONAL THERAPY INITIAL EVALUATION ADULT - MODIFIED CLINICAL TEST OF SENSORY INTEGRATION IN BALANCE TEST
Pt able to ambulate 20 feet x2 within room with CGA using RW, demonstrating fairly steady gait, impaired posture, and decreased step length. Pt required min verbal cues for management of RW.

## 2024-08-07 NOTE — DISCHARGE NOTE PROVIDER - CARE PROVIDER_API CALL
Liseth Hawthorne  Neurology  [address]  [place] [address]  Phone: [phone]  Fax: [phone]  Follow Up Time:     Remi Scott  Neurology  [address] [place] [id number]  Phone: [phone]  Fax: [phone]  Follow Up Time:

## 2024-08-07 NOTE — OCCUPATIONAL THERAPY INITIAL EVALUATION ADULT - DIAGNOSIS, OT EVAL
Pt admitted s/p memory impairment and expressive aphasia, presents with impaired balance, chronic b/l UE tremors, and decreased activity tolerance.

## 2024-08-07 NOTE — DISCHARGE NOTE PROVIDER - HOSPITAL COURSE
Hospital course:  69y Male with PMH     Discharge NIHSS:     During this hospital course, patient had a (ischemic/hemorrhagic) stroke located in (left/right.....) as seen on (MRI/CT).   The stroke etiology is likely secondary to:  []atrial fibrillation  []small vessel disease from atherosclerotic risk factors  []other:  []etiology workup still in progress    Patient had the following workup done in house:  CT Head:   MR Head Non Contrast:  CT Angio Head:  CT Angio Neck:  []echo  []labs  []other    Physical exam at discharge:    New medications on discharge:  Labs to be followed up:  Imaging to be done as outpatient:  Further outpatient workup:   Hospital course:  69y Male with PMH     Discharge NIHSS:     During this hospital course, the patient did not have a stroke. The patient's symptoms are similar to his seizure-activity, which was witnessed by his wife. He underwent vEEG which showed _______.      Patient had the following workup done in house:  CT Head:   MR Head Non Contrast:  CT Angio Head:  CT Angio Neck:  []echo  []labs  []other    Physical exam at discharge:    New medications on discharge:  Labs to be followed up:  Imaging to be done as outpatient:  Further outpatient workup:   Hospital course:  69y Male with PMHx of epilepsy on AED therapy follows Dr. Hawthorne, essential tremors s/p deep brain stimulator follows Dr. Scott, presents to [facility] ER for 1 hour of confusion now returning to baseline, CT Head negative for stroke, vEEG with epileptiform discharges in right temporal and fronto-temporal regions. His presentation correlates with his seizure activity and stroke was ruled out with CT scan. Patient was also taking a lower dose of his home AED. Patients med Zonisamide increased to 50mg BID with patient understanding required dosage to take at home prior to discharge.     Discharge NIHSS: 0    During this hospital course, the patient did not have a stroke. The patient's symptoms are similar to his seizure-activity, which was witnessed by his wife. He underwent vEEG which showed epileptiform discharges in the Right Temporal and Right Fronto-temporal regions.    Patient had the following workup done in house:  CT Head:   CT HEAD: No acute intracranial injury.  CTA of the intracranial circulation: Limited evaluation. No evidence of   stenosis or aneurysm or aneurysm  CTA of the extracranial circulation. Limited evaluation. No evidence of   carotid stenosis.  [x]echo  TTE:   1. Normal left ventricular size and systolic function.   2. Normal right ventricular size and systolic function.   3. Normal atria.   4. Aortic sclerosis without significant stenosis.   5. No evidence of pulmonary hypertension.   6. No pericardial effusion.   7. No prior echo is available for comparison.  []labs  []other    Physical exam at discharge:  Constitutional: No acute distress, conversant  Eyes: Anicteric sclerae, moist conjunctivae, see below for CNs  Neck: trachea midline  Cardiovascular: Regular rate and rhythm  Pulmonary: No use of accessory muscles    Neurologic:  -Mental status: Awake, alert, oriented to person, place, and year (not to month). Non-fluent speech at baseline, with intact naming, repetition, and comprehension, no dysarthria. Follows commands. Attention/concentration intact. Fund of knowledge appropriate.  -Cranial nerves:   II: Visual fields are full to confrontation.  III, IV, VI: Extraocular movements are intact without nystagmus. Pupils equally round and reactive to light  V:  Facial sensation V1-V3 equal and intact   VII: Face is symmetric with normal eye closure and smile  VIII: Hearing is bilaterally intact to finger rub  IX, X: Uvula is midline and soft palate rises symmetrically  XI: Head turning and shoulder shrug are intact.  XII: Tongue protrudes midline  Motor: Normal bulk and tone. No pronator drift. Strength bilateral upper extremity 5/5, more pronounced essential tremor on LUE than RUE. bilateral lower extremities 5/5.  Sensation: Intact to light touch bilaterally. No neglect or extinction on double simultaneous testing.  Coordination: No dysmetria on finger-to-nose and heel-to-shin bilaterally. Essential tremor noted LUE.   Reflexes: Downgoing toes bilaterally   Gait: Narrow gait, baseline unsteady gait but not ataxic   NIHSS: 1    New medications on discharge:  Labs to be followed up: none  Imaging to be done as outpatient: none  Further outpatient workup: Follow-up with epileptologist Dr. Hawthorne   Hospital course:  69y Male with PMHx of epilepsy on AED therapy follows Dr. Hawthorne, essential tremors s/p deep brain stimulator follows Dr. Scott, presents to [facility] ER for 1 hour of confusion now returning to baseline, CT Head negative for stroke, vEEG with epileptiform discharges in right temporal and fronto-temporal regions. His presentation correlates with his seizure activity and stroke was ruled out with CT scan. Patient was also taking a lower dose of his home AED. Patients med Zonisamide increased to 50mg BID with patient understanding required dosage to take at home prior to discharge.     Discharge NIHSS: 0    During this hospital course, the patient did not have a stroke. The patient's symptoms are similar to his seizure-activity, which was witnessed by his wife. He underwent vEEG which showed epileptiform discharges in the Right Temporal and Right Fronto-temporal regions. Dr. Hawthorne recommended increasing his home Zonisamide to 50mg BID (100mg qd) as previously recommended.    Patient had the following workup done in house:  CT Head:   CT HEAD: No acute intracranial injury.  CTA of the intracranial circulation: Limited evaluation. No evidence of   stenosis or aneurysm or aneurysm  CTA of the extracranial circulation. Limited evaluation. No evidence of   carotid stenosis.  [x]echo  TTE:   1. Normal left ventricular size and systolic function.   2. Normal right ventricular size and systolic function.   3. Normal atria.   4. Aortic sclerosis without significant stenosis.   5. No evidence of pulmonary hypertension.   6. No pericardial effusion.   7. No prior echo is available for comparison.  []labs  []other    Physical exam at discharge:  Constitutional: No acute distress, conversant  Eyes: Anicteric sclerae, moist conjunctivae, see below for CNs  Neck: trachea midline  Cardiovascular: Regular rate and rhythm  Pulmonary: No use of accessory muscles    Neurologic:  -Mental status: Awake, alert, oriented to person, place, and year (not to month). Non-fluent speech at baseline, with intact naming, repetition, and comprehension, no dysarthria. Follows commands. Attention/concentration intact. Fund of knowledge appropriate.  -Cranial nerves:   II: Visual fields are full to confrontation.  III, IV, VI: Extraocular movements are intact without nystagmus. Pupils equally round and reactive to light  V:  Facial sensation V1-V3 equal and intact   VII: Face is symmetric with normal eye closure and smile  VIII: Hearing is bilaterally intact to finger rub  IX, X: Uvula is midline and soft palate rises symmetrically  XI: Head turning and shoulder shrug are intact.  XII: Tongue protrudes midline  Motor: Normal bulk and tone. No pronator drift. Strength bilateral upper extremity 5/5, more pronounced essential tremor on LUE than RUE. bilateral lower extremities 5/5.  Sensation: Intact to light touch bilaterally. No neglect or extinction on double simultaneous testing.  Coordination: No dysmetria on finger-to-nose and heel-to-shin bilaterally. Essential tremor noted LUE.   Reflexes: Downgoing toes bilaterally   Gait: Narrow gait, baseline unsteady gait but not ataxic   NIHSS: 1    New medications on discharge: Zonisamide 50mg BID  Labs to be followed up: none  Imaging to be done as outpatient: none  Further outpatient workup: Follow-up with epileptologist Dr. Hawthorne   Hospital course:  69y Male with PMHx of epilepsy on AED therapy follows Dr. Hawthorne, essential tremors s/p deep brain stimulator follows Dr. Scott, presents to [facility] ER for 1 hour of confusion now returning to baseline. Had an ~2 minute episode in the ED where patient was staring off and not responding to his wife, wife confirms that these are the same symptoms that are associated with his baseline seizures. Initially was a stroke code, CTs unrevealing. Was admitted for further management. vEEG with epileptiform discharges in right temporal and fronto-temporal regions. His presentation correlates with his seizure activity. Patient was also taking a lower dose of his home AED prior to his presentation. Patients med Zonisamide increased to 50mg BID with patient understanding required dosage to take at home prior to discharge.     Discharge NIHSS: 0    During this hospital course, the patient did not have a stroke. The patient's symptoms are similar to his seizure-activity, which was witnessed by his wife. He underwent vEEG which showed epileptiform discharges in the Right Temporal and Right Fronto-temporal regions. Dr. Hawthorne recommended increasing his home Zonisamide to 50mg BID (100mg qd) as previously recommended.    Patient had the following workup done in house:  CT Head:   CT HEAD: No acute intracranial injury.  CTA of the intracranial circulation: Limited evaluation. No evidence of   stenosis or aneurysm or aneurysm  CTA of the extracranial circulation. Limited evaluation. No evidence of   carotid stenosis.  CT perfusion:  Limited evaluation due to motion artifact.  Tmax greater than 6 seconds: 13 mL. Mismatch volume:13 mL. Mismatch   ratio: Infinite  [x]echo  TTE:   1. Normal left ventricular size and systolic function.   2. Normal right ventricular size and systolic function.   3. Normal atria.   4. Aortic sclerosis without significant stenosis.   5. No evidence of pulmonary hypertension.   6. No pericardial effusion.   7. No prior echo is available for comparison.  [x]labs  A1C 5.4  LDL 79  TSH 1.17    Physical exam at discharge:  Constitutional: No acute distress, conversant  Eyes: Anicteric sclerae, moist conjunctivae, see below for CNs  Neck: trachea midline  Cardiovascular: Regular rate and rhythm  Pulmonary: No use of accessory muscles    Neurologic:  -Mental status: Awake, alert, oriented to person, place, and year (not to month). Non-fluent speech at baseline, with intact naming, repetition, and comprehension, no dysarthria. Follows commands. Attention/concentration intact. Fund of knowledge appropriate.  -Cranial nerves:   II: Visual fields are full to confrontation.  III, IV, VI: Extraocular movements are intact without nystagmus. Pupils equally round and reactive to light  V:  Facial sensation V1-V3 equal and intact   VII: Face is symmetric with normal eye closure and smile  VIII: Hearing is bilaterally intact to finger rub  IX, X: Uvula is midline and soft palate rises symmetrically  XI: Head turning and shoulder shrug are intact.  XII: Tongue protrudes midline  Motor: Normal bulk and tone. No pronator drift. Strength bilateral upper extremity 5/5, more pronounced essential tremor on LUE than RUE. bilateral lower extremities 5/5.  Sensation: Intact to light touch bilaterally. No neglect or extinction on double simultaneous testing.  Coordination: No dysmetria on finger-to-nose and heel-to-shin bilaterally. Essential tremor noted LUE.   Reflexes: Downgoing toes bilaterally   Gait: Narrow gait, baseline unsteady gait but not ataxic   NIHSS: 1    New medications on discharge: Zonisamide 50mg BID  Labs to be followed up: none  Imaging to be done as outpatient: none  Further outpatient workup: Follow-up with epileptologist Dr. Hawthorne   Hospital course:  69y Male with PMHx of epilepsy on AED therapy follows Dr. Hawthorne, essential tremors s/p deep brain stimulator follows Dr. Scott, presents to [facility] ER for 1 hour of confusion now returning to baseline. Had an ~2 minute episode in the ED where patient was staring off and not responding to his wife, wife confirms that these are the same symptoms that are associated with his baseline seizures. Initially was a stroke code, CTs unrevealing. Was admitted for further management. vEEG with epileptiform discharges in right temporal and fronto-temporal regions. His presentation correlates with his seizure activity. Patient was also taking a lower dose of his home AED prior to his presentation. Patients med Zonisamide increased to 50mg BID with patient understanding required dosage to take at home prior to discharge.     During this hospital course, the patient did not have a stroke. The patient's symptoms are similar to his seizure-activity, which was witnessed by his wife. He underwent vEEG which showed epileptiform discharges in the Right Temporal and Right Fronto-temporal regions. Dr. Hawthorne recommended increasing his home Zonisamide to 50mg BID (100mg qd) as previously recommended.    Patient had the following workup done in house:  CT Head:   CT HEAD: No acute intracranial injury.  CTA of the intracranial circulation: Limited evaluation. No evidence of   stenosis or aneurysm or aneurysm  CTA of the extracranial circulation. Limited evaluation. No evidence of   carotid stenosis.  CT perfusion:  Limited evaluation due to motion artifact.  Tmax greater than 6 seconds: 13 mL. Mismatch volume:13 mL. Mismatch   ratio: Infinite  [x]echo  TTE:   1. Normal left ventricular size and systolic function.   2. Normal right ventricular size and systolic function.   3. Normal atria.   4. Aortic sclerosis without significant stenosis.   5. No evidence of pulmonary hypertension.   6. No pericardial effusion.   7. No prior echo is available for comparison.  [x]labs  A1C 5.4  LDL 79  TSH 1.17    Physical exam at discharge:  Constitutional: No acute distress, conversant  Eyes: Anicteric sclerae, moist conjunctivae, see below for CNs  Neck: trachea midline  Cardiovascular: Regular rate and rhythm  Pulmonary: No use of accessory muscles    Neurologic:  -Mental status: Awake, alert, oriented to person, place, and year (not to month). Non-fluent speech at baseline, with intact naming, repetition, and comprehension, no dysarthria. Follows commands. Attention/concentration intact. Fund of knowledge appropriate.  -Cranial nerves:   II: Visual fields are full to confrontation.  III, IV, VI: Extraocular movements are intact without nystagmus. Pupils equally round and reactive to light  V:  Facial sensation V1-V3 equal and intact   VII: Face is symmetric with normal eye closure and smile  VIII: Hearing is bilaterally intact to finger rub  IX, X: Uvula is midline and soft palate rises symmetrically  XI: Head turning and shoulder shrug are intact.  XII: Tongue protrudes midline  Motor: Normal bulk and tone. No pronator drift. Strength bilateral upper extremity 5/5, more pronounced essential tremor on LUE than RUE. bilateral lower extremities 5/5.  Sensation: Intact to light touch bilaterally. No neglect or extinction on double simultaneous testing.  Coordination: No dysmetria on finger-to-nose and heel-to-shin bilaterally. Essential tremor noted LUE.   Reflexes: Downgoing toes bilaterally   Gait: Narrow gait, baseline unsteady gait but not ataxic     Discharge NIHSS: 1    New medications on discharge: Zonisamide 50mg BID  Labs to be followed up: none  Imaging to be done as outpatient: none  Further outpatient workup: Follow-up with epileptologist Dr. Hawthorne

## 2024-08-07 NOTE — DISCHARGE NOTE NURSING/CASE MANAGEMENT/SOCIAL WORK - NSDCPEFALRISK_GEN_ALL_CORE
For information on Fall & Injury Prevention, visit: [email or web address] OR  [email or web address] OR  https://www.cdc.gov/steadi/patient.html

## 2024-08-07 NOTE — PHYSICAL THERAPY INITIAL EVALUATION ADULT - GAIT DEVIATIONS NOTED, PT EVAL
Patient demos slightly unsteady gait with increased lateral sway. No LOB observed, but benefits from occasional PT assist for safety. Patient states his balance is improved when ambulating in shoes vs hospital socks./decreased jony/increased time in double stance/decreased velocity of limb motion/decreased step length/decreased stride length/decreased weight-shifting ability

## 2024-08-07 NOTE — DISCHARGE NOTE PROVIDER - NSDCCPCAREPLAN_GEN_ALL_CORE_FT
PRINCIPAL DISCHARGE DIAGNOSIS  Diagnosis: Seizure  Assessment and Plan of Treatment: You have a brain condition where you are prone to getting seizures. Seizures are abnormal electrical activity in your brain which can cause you have different symptoms such as staring off into space, jerking movements, loss of consciousness, and more. Everyone’s seizure is unique. Seizures are dangerous because they can cause you to stop breathing, cause permanent damage to your brain, and puts you at risk for falls and environmental hazards. Please continue taking your seizure medications as prescribed. Call 911 or seek immediate care if your seizure lasts longer than 5 minutes, have trouble breathing, have a second seizure within 24 hours of your first, or if you are injured during a seizure. Please follow with your neurologist, to be evaluated for your seizures.       PRINCIPAL DISCHARGE DIAGNOSIS  Diagnosis: Seizure  Assessment and Plan of Treatment: You have a brain condition where you are prone to getting seizures. Seizures are abnormal electrical activity in your brain which can cause you have different symptoms such as staring off into space, jerking movements, loss of consciousness, and more. Everyone’s seizure is unique. Seizures are dangerous because they can cause you to stop breathing, cause permanent damage to your brain, and puts you at risk for falls and environmental hazards. Please continue taking your seizure medications as prescribed. Call 911 or seek immediate care if your seizure lasts longer than 5 minutes, have trouble breathing, have a second seizure within 24 hours of your first, or if you are injured during a seizure. Please follow with your neurologist, to be evaluated for your seizures.  **Please continue taking Briviact 125mg twice a day and Zonisamide 50mg twice a day   **Please follow-up with Dr. Hawthorne within 2-3 weeks of discharge

## 2024-08-07 NOTE — CONSULT NOTE ADULT - ASSESSMENT
Neurology    69 y o Male with PMHx of epilepsy on AED therapy follows Dr. Hawthorne, essential tremors s/p deep brain stimulator follows Dr. Scott, s/p defibrillator presents to [facility] ER for 1 hour of confusion now returning to baseline. Wife at bedside states pt woke up in his USOH, around 1000 pt was trying to charge his deep brain stimulator but it needs to be charged tomorrow, pt was also confused on how to charge it which is unusual for him as he is proficient in doing this independently. There was ?slowness in his speech during this point, was telling his daughter on the phone that he needs to go to the hospital because he felt off. Per wife, when EMS was testing year he answered incorrectly at home and in ambulance. BP on arrival to [facility] 170/125, Stroke code called. NIHSS 1. CTH negative for acute intracranial pathology, CTP with tmax >6 secs in right hemisphere 13mL but likely due to motion artifact, CTA H/N negative for stenosis or occlusion. Pt with transient episode of expressive aphasia in ED ~ 2 minutes and spontaneously resolved, wife noted pt had a similar episode 3 days ago lasting 15-30 seconds and resolved. Pt has never had his seizures present as expressive aphasia. Epilepsy consulted in ED, recommended to take his zonisamide as prescribed otherwise signed off. Admitted to stroke tele for eval seizure with EEG and possible MRI brain.     Neuro  #CVA workup  - start aspirin 81mg daily  - continue atorvastatin 80mg daily  - q4hr stroke neuro checks and vitals  - obtain MRI Brain without contrast if deep brain stimulator is MRI compatible   - Stroke Code HCT Results: neg  - Stroke Code CTA Results: neg   - Stroke education    #epilepsy  - continue briviact 125mg BID   - continue zonisamide as prescribed - 50mg BID per epilepsy as pt could not tolerate 100mg QHS  - f/u serum briviact and zonisamide levels     #essential tremor s/p deep brain stimulator  - confirm if deep brain stimulator is MRI compatible - outpt movement d/o Dr. Scott  - continue gabapentin 600mg TID    Cards  #HTN  - permissive hypertension, Goal -180  - continue home amlodipine 5mg daily   - obtain TTE     #HLD  - high dose statin as above in CVA  - LDL results: pending     Pulm  - call provider if SPO2 < 94%    GI  #Nutrition/Fluids/Electrolytes   - replete K<4 and Mg <2  - Diet: DASH/TLC     Renal  - monitor and trend Cr    Infectious Disease  - afebrile on admission, no leukocytosis    Endocrine  #DM  - A1C results: pending     - TSH results: pending     DVT Prophylaxis  - lovenox sq for DVT prophylaxis   - SCDs for DVT prophylaxis       IDR Goals: Goals reviewed at interdisciplinary rounds with case management, social work, physical therapy, occupational therapy, and speech language pathology.   Please see specific therapy  notes for in depth goals.  Dispo: pending PT/OT      Discussed daily hospital plans and goals with patient and family at bedside.    Discussed with Neurology Attending Dr. Fletcher

## 2024-08-07 NOTE — OCCUPATIONAL THERAPY INITIAL EVALUATION ADULT - GENERAL OBSERVATIONS, REHAB EVAL
MRS1. Pt received semi-supine in bed, +tele, +vEEG, +heplock, NAD, and agreeable to OT. Cleared by NERY Coffman to see.

## 2024-08-07 NOTE — DISCHARGE NOTE PROVIDER - NSDCMRMEDTOKEN_GEN_ALL_CORE_FT
amLODIPine 5 mg oral tablet: 1 tab(s) orally once a day  Briviact 100 mg oral tablet: 1 tab(s) orally 2 times a day  Briviact 25 mg oral tablet: 1 tab(s) orally every 12 hours  gabapentin 600 mg oral tablet: 1 tab(s) orally every 8 hours  zonisamide 50 mg oral capsule: 1 cap(s) orally once a day (at bedtime)   amLODIPine 5 mg oral tablet: 1 tab(s) orally once a day  Briviact 100 mg oral tablet: 1 tab(s) orally 2 times a day  Briviact 25 mg oral tablet: 1 tab(s) orally every 12 hours  gabapentin 600 mg oral tablet: 1 tab(s) orally every 8 hours  zonisamide 50 mg oral capsule: 1 cap(s) orally 2 times a day

## 2024-08-07 NOTE — DISCHARGE NOTE NURSING/CASE MANAGEMENT/SOCIAL WORK - PATIENT PORTAL LINK FT
You can access the FollowMyHealth Patient Portal offered by [facility] by registering at the following website: [email or web address]. By joining [employer]’s FollowMyHealth portal, you will also be able to view your health information using other applications (apps) compatible with our system.

## 2024-08-07 NOTE — DISCHARGE NOTE PROVIDER - NSDCFUADDAPPT_GEN_ALL_CORE_FT
1. Please follow-up with Dr. Hawthorne, your epilepsy doctor, within 2-3 weeks of discharge.    2. Please follow-up with Dr. Scott within 2-3 weeks of discharge.

## 2024-08-07 NOTE — SBIRT NOTE ADULT - NSSBIRTUNABLESCR_GEN_A_CORE
Other (specify) I personally evaluated the patient. I reviewed the Resident’s or Physician Assistant’s note (as assigned above), and agree with the findings and plan except as documented in my note. Patient evaluated for chest pain. STEMI code called upon arrival, cancelled by cardio. Labs, ekg, cxr performed in ED. Admitted to medicine for further evaluation and treatment.

## 2024-08-07 NOTE — CHART NOTE - NSCHARTNOTEFT_GEN_A_CORE
Patient will require a rolling walker at home due to their diagnosis of Epilepsy (G40.909) and essential tremors (G25.0) to help complete the MRADL's.
Called by ER for transient expressive aphasia lasting 2 minutes, per ER provider had gibberish speech and was unable to get his words out. By the time I went to examine patient, he was back to his baseline. BP at time of event was 159/84. Per wife, patient had a similar episode on 8/3 that lasted ~15-30 seconds and resolved.     Due to stereotypical nature of symptoms, would favor seizure over TIA at this time, especially as CTA is negative for steno-occlusive disease. Informed epilepsy consult resident of event.
During rounds telemetry observed w/ sinus bradycardia Hr 40s  Pt was asleep and saturating 98% ORA  Chart reviewed w/o documentation of cardiac arrythmia or sleep apnea and normal sinus on EKG  Upon awakening Hr improves to 90s  Pt denies h/o sleep apnea. He also denies feeling light headed, dizzy or dyspneic     EKG done observed sinus bradycardia Hr 44bpm   Pt is pending TTE/ZINA and TSH

## 2024-08-07 NOTE — CONSULT NOTE ADULT - SUBJECTIVE AND OBJECTIVE BOX
69y Male with PMHx of epilepsy on AED therapy follows Dr. Hawthorne, essential tremors s/p deep brain stimulator follows Dr. Scott, s/p defibrillator presents to [facility] ER for 1 hour of confusion now returning to baseline. Wife at bedside states pt woke up in his USOH, around 1000 pt was trying to charge his deep brain stimulator but it needs to be charged tomorrow, pt was also confused on how to charge it which is unusual for him as he is proficient in doing this independently. There was ?slowness in his speech during this point, was telling his daughter on the phone that he needs to go to the hospital because he felt off. Per wife, when EMS was testing year he answered incorrectly at home and in ambulance. BP on arrival to [facility] 170/125, Stroke code called. NIHSS 1. CTH negative for acute intracranial pathology, CTP with tmax >6 secs in right hemisphere 13mL but likely due to motion artifact, CTA H/N negative for stenosis or occlusion. Pt with transient episode of expressive aphasia in ED ~ 2 minutes and spontaneously resolved, wife noted pt had a similar episode 3 days ago lasting 15-30 seconds and resolved. Pt has never had his seizures present as expressive aphasia. Epilepsy consulted in ED, recommended to take his zonisamide as prescribed otherwise signed off.      SUBJECTIVE:  Patient was seen and examined at bedside. Patient reports had difficulty with his pump yesterday, denies other symptoms. Unsure of his baseline HR, denies chest pain. No other complaints or events reported. Telemetry reviewed blocked Pacs    Review of systems: No fever, chills, dizziness, HA, Changes in vision, CP, dyspnea, nausea or vomiting, dysuria, changes in bowel movements, LE edema. Rest of 12 point Review of systems negative unless otherwise documented elsewhere in note.     Diet, DASH/TLC:   Sodium & Cholesterol Restricted (08-06-24 @ 19:41) [Active]      MEDICATIONS:  MEDICATIONS  (STANDING):  amLODIPine   Tablet 5 milliGRAM(s) Oral daily  aspirin enteric coated 81 milliGRAM(s) Oral daily  atorvastatin 80 milliGRAM(s) Oral at bedtime  brivaracetam 25 milliGRAM(s) Oral every 12 hours  brivaracetam 100 milliGRAM(s) Oral every 12 hours  enoxaparin Injectable 40 milliGRAM(s) SubCutaneous every 24 hours  gabapentin 600 milliGRAM(s) Oral every 8 hours  zonisamide 50 milliGRAM(s) Oral every 12 hours    MEDICATIONS  (PRN):      Allergies    No Known Allergies    Intolerances        OBJECTIVE:  Vital Signs Last 24 Hrs  T(C): 37.2 (07 Aug 2024 10:23), Max: 37.4 (07 Aug 2024 05:00)  T(F): 99 (07 Aug 2024 10:23), Max: 99.3 (07 Aug 2024 05:00)  HR: 57 (07 Aug 2024 09:21) (50 - 86)  BP: 142/66 (07 Aug 2024 09:21) (128/68 - 208/120)  BP(mean): 96 (07 Aug 2024 09:21) (91 - 137)  RR: 18 (07 Aug 2024 09:21) (15 - 30)  SpO2: 96% (07 Aug 2024 09:21) (95% - 99%)    Parameters below as of 07 Aug 2024 09:21  Patient On (Oxygen Delivery Method): room air      I&O's Summary    06 Aug 2024 07:01  -  07 Aug 2024 07:00  --------------------------------------------------------  IN: 0 mL / OUT: 300 mL / NET: -300 mL    07 Aug 2024 07:01  -  07 Aug 2024 12:12  --------------------------------------------------------  IN: 180 mL / OUT: 0 mL / NET: 180 mL        PHYSICAL EXAM:  General:  HEENT:  Lungs:  Heart:  Abdomen:  Extremities:    LABS:                        14.3   6.37  )-----------( 197      ( 07 Aug 2024 05:30 )             42.2     08-07    136  |  100  |  11  ----------------------------<  111<H>  3.8   |  23  |  0.78    Ca    9.4      07 Aug 2024 05:30  Phos  2.5     08-07  Mg     2.1     08-07    TPro  8.4<H>  /  Alb  4.9  /  TBili  0.6  /  DBili  x   /  AST  32  /  ALT  14  /  AlkPhos  82  08-06    LIVER FUNCTIONS - ( 06 Aug 2024 11:33 )  Alb: 4.9 g/dL / Pro: 8.4 g/dL / ALK PHOS: 82 U/L / ALT: 14 U/L / AST: 32 U/L / GGT: x           PT/INR - ( 06 Aug 2024 11:33 )   PT: 11.5 sec;   INR: 1.01          PTT - ( 06 Aug 2024 11:33 )  PTT:33.4 sec  CAPILLARY BLOOD GLUCOSE        Urinalysis Basic - ( 07 Aug 2024 05:30 )    Color: x / Appearance: x / SG: x / pH: x  Gluc: 111 mg/dL / Ketone: x  / Bili: x / Urobili: x   Blood: x / Protein: x / Nitrite: x   Leuk Esterase: x / RBC: x / WBC x   Sq Epi: x / Non Sq Epi: x / Bacteria: x        MICRODATA:    Urinalysis with Rflx Culture (collected 06 Aug 2024 11:33)        RADIOLOGY/OTHER STUDIES:

## 2024-08-07 NOTE — PHYSICAL THERAPY INITIAL EVALUATION ADULT - PERTINENT HX OF CURRENT PROBLEM, REHAB EVAL
69y Male with PMHx of epilepsy on AED therapy follows Dr. Hawthorne, essential tremors s/p deep brain stimulator follows Dr. Scott, s/p defibrillator presents to [facility] ER for 1 hour of confusion now returning to baseline. Wife at bedside states pt woke up in his USOH, around 1000 pt was trying to charge his deep brain stimulator but it needs to be charged tomorrow, pt was also confused on how to charge it which is unusual for him as he is proficient in doing this independently. There was ?slowness in his speech during this point, was telling his daughter on the phone that he needs to go to the hospital because he felt off. Per wife, when EMS was testing year he answered incorrectly at home and in ambulance. BP on arrival to [facility] 170/125, Stroke code called. NIHSS 1. CTH negative for acute intracranial pathology, CTP with tmax >6 secs in right hemisphere 13mL but likely due to motion artifact, CTA H/N negative for stenosis or occlusion. Pt with transient episode of expressive aphasia in ED ~ 2 minutes and spontaneously resolved, wife noted pt had a similar episode 3 days ago lasting 15-30 seconds and resolved. Pt has never had his seizures present as expressive aphasia. Epilepsy consulted in ED, recommended to take his zonisamide as prescribed otherwise signed off. Admitted to stroke tele for eval seizure with EEG and possible MRI brain.

## 2024-08-07 NOTE — EEG REPORT - NS EEG TEXT BOX
[facility] Department of Neurology  Inpatient Continuous video-Electroencephalogram      Patient Name:	SYBIL JANSEN    :	1955  MRN:	[id number]    Study Start Date/Time:	2024, 7:58:20 PM  Study End Date/Time:    Referred by:  Shelby Fletcher MD    Brief Clinical History:  SYBIL JANSEN is a 69 year old Male; study performed to investigate for seizures or markers of epilepsy.   Technologist notes: -  Diagnosis Code:  R56.9 convulsions/seizure    Pertinent Medication:  n/a    Acquisition Details:  Electroencephalography was acquired using a minimum of 21 channels on an [employer] Neurology system v 9.3.1 with electrode placement according to the standard International 10-20 system following ACNS (American Clinical Neurophysiology Society) guidelines.  Anterior temporal T1 and T2 electrodes were utilized whenever possible.  The [employer] automated spike & seizure detections were all reviewed in detail, in addition to the entire raw EEG.    Findings:  Day 1:  2024, 7:58:20 PM to next morning at 07:00 AM   Background:  continuous, with predominantly alpha and beta frequencies.  Generalized Slowing:  None  Symmetry/Focality: No persistent asymmetries of voltage or frequency.                    Voltage:  Normal (20+ uV)  Organization:  Appropriate anterior-posterior gradient  Posterior Dominant Rhythm:  8.5 Hz symmetric, well-organized, and well-modulated  Sleep:  Symmetric, synchronous spindles and K complexes.  Variability:   Yes		Reactivity:  Yes    Spontaneous Activity:  Occasional ( >1/hr < 1/min) Right anteriotemporal (F8-T4)  Epileptiform sharp waves ( msec)  Events:  1)	No electrographic seizures or significant clinical events occurred during this study.  Provocations:  •	Hyperventilation: was not performed.  •	Photic stimulation: was not performed.  Daily Summary:    1)	There were epileptiform discharges. Right Temporal and Right Fronto-temporal region  2)	No seizures are seen  3)	There were no findings of active epilepsy, however this alone does not rule out the diagnosis.         Daniel Hernadez MD  Attending Neurologist, [facility]

## 2024-08-07 NOTE — CONSULT NOTE ADULT - ASSESSMENT
69y Male with PMHx of epilepsy on AED therapy follows Dr. Hawthorne, essential tremors s/p deep brain stimulator follows Dr. Scott, s/p defibrillator presents to [facility] ER for 1 hour of confusion now returning to baseline. Wife at bedside states pt woke up in his USOH, around 1000 pt was trying to charge his deep brain stimulator but it needs to be charged tomorrow, pt was also confused on how to charge it which is unusual for him as he is proficient in doing this independently. There was ?slowness in his speech during this point, was telling his daughter on the phone that he needs to go to the hospital because he felt off. Per wife, when EMS was testing year he answered incorrectly at home and in ambulance. BP on arrival to [facility] 170/125, Stroke code called. NIHSS 1. CTH negative for acute intracranial pathology, CTP with tmax >6 secs in right hemisphere 13mL but likely due to motion artifact, CTA H/N negative for stenosis or occlusion. Pt with transient episode of expressive aphasia in ED ~ 2 minutes and spontaneously resolved, wife noted pt had a similar episode 3 days ago lasting 15-30 seconds and resolved. Pt has never had his seizures present as expressive aphasia. Epilepsy consulted in ED, recommended to take his zonisamide as prescribed otherwise signed off. Admitted to stroke tele for eval seizure with EEG and possible MRI brain.     CVA work-up  Management per primary team  Follow-up ZINA, MRI if possible     Epilepsy  vEEG in place.  Appreciate Epilepsy    Essential tremors  Patient has pump in place    Bradycardia  Sinus bradycardia on Telemetry, asymptomatic. BP stable  TSH WNL  Follow-up TTE    DVT ppx: SQH  Discussed with primary team

## 2024-08-07 NOTE — PHYSICAL THERAPY INITIAL EVALUATION ADULT - ADDITIONAL COMMENTS
Community ambulator who lives with his wife in elevator access apartment, +2 ANNMARIE. Independent with all ADLS prior and ambulates with use of SC Community ambulator who lives with his wife in elevator access apartment, +2 ANNMARIE. Patient reports being both Independent with all ADLS prior and also has assistance from [facility] for ~24 hours/week and ambulates with use of SC

## 2024-08-07 NOTE — PHYSICAL THERAPY INITIAL EVALUATION ADULT - MODALITIES TREATMENT COMMENTS
R hand dominant; CN Testing: B/L Frontalis intact; B/L buccinator intact; smile symmetrical; tongue protrusion with slight deviation to R side; B/L eyes open/close intact; Shoulder elevation: intact bilaterally; Vision H-Test: bilateral tracking and smooth pursuit intact; Convergence/Divergence: intact; Vision Quadrant Test: intact bilaterally. Rapid alternating movements: WFL

## 2024-08-07 NOTE — OCCUPATIONAL THERAPY INITIAL EVALUATION ADULT - ADDITIONAL COMMENTS
Pt states he lives with his wife in an elevator accessible apartment, 2 ANNMARIE. Pt is independent with ADLs, IADLs, and mobility tasks, use of SC. Pt reports no DME and tub in bathroom. Pt is R hand dominant.

## 2024-08-09 LAB — ZONISAMIDE SERPL-MCNC: 2.6 UG/ML — LOW (ref 10–40)

## 2024-08-11 LAB — BRIVARACETAM SERPL-MCNC: 5.03 UG/ML — HIGH (ref 0.2–2)

## 2024-08-16 DIAGNOSIS — G40.909 EPILEPSY, UNSPECIFIED, NOT INTRACTABLE, WITHOUT STATUS EPILEPTICUS: ICD-10-CM

## 2024-08-16 DIAGNOSIS — E11.9 TYPE 2 DIABETES MELLITUS WITHOUT COMPLICATIONS: ICD-10-CM

## 2024-08-16 DIAGNOSIS — G25.0 ESSENTIAL TREMOR: ICD-10-CM

## 2024-08-16 DIAGNOSIS — R41.0 DISORIENTATION, UNSPECIFIED: ICD-10-CM

## 2024-08-16 DIAGNOSIS — R00.1 BRADYCARDIA, UNSPECIFIED: ICD-10-CM

## 2024-08-16 DIAGNOSIS — E78.5 HYPERLIPIDEMIA, UNSPECIFIED: ICD-10-CM

## 2024-08-16 DIAGNOSIS — I10 ESSENTIAL (PRIMARY) HYPERTENSION: ICD-10-CM

## 2024-09-23 ENCOUNTER — APPOINTMENT (OUTPATIENT)
Dept: NEUROLOGY | Facility: CLINIC | Age: 69
End: 2024-09-23

## 2024-10-03 ENCOUNTER — NON-APPOINTMENT (OUTPATIENT)
Age: 69
End: 2024-10-03

## 2024-10-04 ENCOUNTER — APPOINTMENT (OUTPATIENT)
Dept: NEUROLOGY | Facility: CLINIC | Age: 69
End: 2024-10-04
Payer: MEDICARE

## 2024-10-04 VITALS
HEART RATE: 75 BPM | SYSTOLIC BLOOD PRESSURE: 161 MMHG | TEMPERATURE: 97.7 F | HEIGHT: 65 IN | OXYGEN SATURATION: 95 % | BODY MASS INDEX: 20.33 KG/M2 | DIASTOLIC BLOOD PRESSURE: 83 MMHG | WEIGHT: 122 LBS

## 2024-10-04 DIAGNOSIS — G40.909 EPILEPSY, UNSPECIFIED, NOT INTRACTABLE, W/OUT STATUS EPILEPTICUS: ICD-10-CM

## 2024-10-04 PROCEDURE — 99214 OFFICE O/P EST MOD 30 MIN: CPT

## 2024-10-04 PROCEDURE — G2211 COMPLEX E/M VISIT ADD ON: CPT

## 2024-10-04 NOTE — PHYSICAL EXAM
[General Appearance - Alert] : alert [General Appearance - In No Acute Distress] : in no acute distress [Person] : oriented to person [Place] : oriented to place [Time] : oriented to time [Fluency] : fluency intact [Cranial Nerves Facial (VII)] : face symmetrical [Cranial Nerves Oculomotor (III)] : extraocular motion intact [Cranial Nerves Vestibulocochlear (VIII)] : hearing was intact bilaterally [Cranial Nerves Accessory (XI - Cranial And Spinal)] : head turning and shoulder shrug symmetric [Motor Strength] : muscle strength was normal in all four extremities [Sensation Tactile Decrease] : light touch was intact [Abnormal Walk] : normal gait [FreeTextEntry8] : tremor with outstretched hands L>>R

## 2024-10-04 NOTE — DISCUSSION/SUMMARY
[FreeTextEntry1] : 68 yo M CT s/p DBS placement and epilepsy presents for epilepsy management. Also with some memory changes EMU 8/17/2022- EEG showed frequent right temporal sharp discharges and slowing.  plan: Again instructed to increase ZNS to 100mg/night- patient is agreeable now Continue Briviact 125mg BID and /700/600mg (per Movement d/o) F/u in 3-4 months

## 2024-10-04 NOTE — HISTORY OF PRESENT ILLNESS
[FreeTextEntry1] : Interim Hx: 10/4/2024  Patient hospitalized in August suspected seizure. Was acting confused  8/6-8/7/2024 EEG- showed occasional right frontotemporal epileptiform discharges He was instructed to increased ZNS to 100mg/night. Patient admits he did not increase the dose b/c he feels like he is on too much medication. In the past when had tried 100mg felt dizzy so stopped it.  Wife reports since hospitalization he has had 3 very brief focal seizures <10 seconds where he just stares. __________________ Interim Hx: 3/26/2024  Wife reports 3 small seizures, described as staring since last visit. Most recently last week Did not increase ZNS as discussed last visit  Current AEDs: ZNS 50mg/night,  Briviact 125mg BID and GBP 600mg TID (for tremors per patient) No side effects  6/5-6/6/2023- Occasional to frequent sleep activated right temporal sharp waves  Also reporting some trouble with memory Annual with PMD in April ______________________ Interim Hx: 5/10/2023  One small seizure since last visit, occurred few weeks go in the shower. Wife reports he became briefly confused.  Did not increase ZNS to 50mg BID as discussed last visit. Tolerating ZNS 50mg/daily, no longer having side effects.  ____________________ Interim Hx: 1/25/2023  Wife reports that since august patient has had 3 small seizures, most recent 2 weeks ago. seizures described as staring, lasts seconds. No convulsive seizures. Started ZNS 50mg after last visit for 2 weeks then he tried increasing to 100mg for 1 day and felt unsteady so went back to 50mg/night.   Current AEDs: ZNS 50mg/night,  Briviact 125mg BID and GBP 600mg TID for tremors per patient)  Patient report that tremors have improved with the addition of ZNS. He has been unable to shave for the past 5 years b/c of tremors (wife would do it). He has been shaving the past few months bc tremors better controlled. __________________ Interim Hx: 8/24/2022  Patient admitted to EMU 8/17/2022 EEG showed frequent right temporal sharp discharges and slowing. Briviact was increased to 150mg BID. Patient reports that he only took the higher dose for 2 days. He felt it was making him too dizzy so stopped it. He resumed his previous dose of 150mg BID and is feeling better.  Previous med trials he can recall- Dilantin, Phenobarbital, Keppra ________________ Initial Hx: 7/13/2022 66 yo M CT s/p DBS placement and epilepsy presents for epilepsy management  Patient reports that first seizure was 5 years ago Seizures described as generalized tonic clonic seizures, at times a/w with tongue bite He believes that the frequency has been increasing and now occurs every month or so. He says he wife will say that he "got sick" and he will not know what happened.  Previously followed with Neurologist Dr. Barbara Koeppel at [facility] for his seizures. She has reportedly retired. No records currently available.  Patient does not recall all previous med tried- recalls LEV and Dilantin but does not know why it was changed. Also not sure when his medication doses were changed.  Current meds: Briviact 125mg BID GBP 600mg TID - patient says this this is for tremors   He follows with Dr. Jordan Movement d/o specialist for essential tremors s/p DBS

## 2024-10-31 ENCOUNTER — APPOINTMENT (OUTPATIENT)
Dept: NEUROLOGY | Facility: CLINIC | Age: 69
End: 2024-10-31

## 2025-02-04 ENCOUNTER — NON-APPOINTMENT (OUTPATIENT)
Age: 70
End: 2025-02-04

## 2025-02-04 ENCOUNTER — APPOINTMENT (OUTPATIENT)
Dept: NEUROLOGY | Facility: CLINIC | Age: 70
End: 2025-02-04
Payer: MEDICARE

## 2025-02-04 VITALS
DIASTOLIC BLOOD PRESSURE: 73 MMHG | BODY MASS INDEX: 20.66 KG/M2 | HEIGHT: 65 IN | OXYGEN SATURATION: 95 % | WEIGHT: 124 LBS | TEMPERATURE: 97.7 F | SYSTOLIC BLOOD PRESSURE: 161 MMHG

## 2025-02-04 DIAGNOSIS — G40.909 EPILEPSY, UNSPECIFIED, NOT INTRACTABLE, W/OUT STATUS EPILEPTICUS: ICD-10-CM

## 2025-02-04 PROCEDURE — 99214 OFFICE O/P EST MOD 30 MIN: CPT

## 2025-02-04 PROCEDURE — G2211 COMPLEX E/M VISIT ADD ON: CPT

## 2025-02-04 RX ORDER — LACOSAMIDE 50 MG/1
50 TABLET ORAL
Qty: 60 | Refills: 3 | Status: ACTIVE | COMMUNITY
Start: 2025-02-04 | End: 1900-01-01

## 2025-04-03 ENCOUNTER — APPOINTMENT (OUTPATIENT)
Dept: NEUROLOGY | Facility: CLINIC | Age: 70
End: 2025-04-03

## 2025-06-26 ENCOUNTER — APPOINTMENT (OUTPATIENT)
Dept: NEUROLOGY | Facility: CLINIC | Age: 70
End: 2025-06-26

## 2025-07-01 ENCOUNTER — APPOINTMENT (OUTPATIENT)
Dept: NEUROLOGY | Facility: CLINIC | Age: 70
End: 2025-07-01
Payer: MEDICARE

## 2025-07-01 VITALS
TEMPERATURE: 96.3 F | BODY MASS INDEX: 20.16 KG/M2 | SYSTOLIC BLOOD PRESSURE: 183 MMHG | OXYGEN SATURATION: 95 % | DIASTOLIC BLOOD PRESSURE: 91 MMHG | WEIGHT: 121 LBS | HEART RATE: 86 BPM | HEIGHT: 65 IN

## 2025-07-01 PROCEDURE — G2211 COMPLEX E/M VISIT ADD ON: CPT

## 2025-07-01 PROCEDURE — 99214 OFFICE O/P EST MOD 30 MIN: CPT

## 2025-08-28 ENCOUNTER — APPOINTMENT (OUTPATIENT)
Dept: NEUROLOGY | Facility: CLINIC | Age: 70
End: 2025-08-28
Payer: MEDICARE

## 2025-08-28 VITALS
DIASTOLIC BLOOD PRESSURE: 78 MMHG | BODY MASS INDEX: 19.83 KG/M2 | WEIGHT: 119 LBS | SYSTOLIC BLOOD PRESSURE: 146 MMHG | HEIGHT: 65 IN | OXYGEN SATURATION: 96 % | HEART RATE: 65 BPM | TEMPERATURE: 96.3 F

## 2025-08-28 DIAGNOSIS — G25.0 ESSENTIAL TREMOR: ICD-10-CM

## 2025-08-28 DIAGNOSIS — R26.89 OTHER ABNORMALITIES OF GAIT AND MOBILITY: ICD-10-CM

## 2025-08-28 PROCEDURE — 95983 ALYS BRN NPGT PRGRMG 15 MIN: CPT

## 2025-08-28 PROCEDURE — 99214 OFFICE O/P EST MOD 30 MIN: CPT | Mod: 25
